# Patient Record
Sex: MALE | Race: WHITE | NOT HISPANIC OR LATINO | URBAN - METROPOLITAN AREA
[De-identification: names, ages, dates, MRNs, and addresses within clinical notes are randomized per-mention and may not be internally consistent; named-entity substitution may affect disease eponyms.]

---

## 2017-05-29 ENCOUNTER — INPATIENT (INPATIENT)
Facility: HOSPITAL | Age: 82
LOS: 0 days | Discharge: HOME | End: 2017-05-30
Attending: HOSPITALIST

## 2017-05-29 ENCOUNTER — EMERGENCY (EMERGENCY)
Facility: HOSPITAL | Age: 82
LOS: 0 days | Discharge: HOME | End: 2017-05-29

## 2017-05-29 DIAGNOSIS — Z86.73 PERSONAL HISTORY OF TRANSIENT ISCHEMIC ATTACK (TIA), AND CEREBRAL INFARCTION WITHOUT RESIDUAL DEFICITS: ICD-10-CM

## 2017-05-29 DIAGNOSIS — I25.10 ATHEROSCLEROTIC HEART DISEASE OF NATIVE CORONARY ARTERY WITHOUT ANGINA PECTORIS: ICD-10-CM

## 2017-05-29 DIAGNOSIS — Z95.1 PRESENCE OF AORTOCORONARY BYPASS GRAFT: ICD-10-CM

## 2017-05-29 DIAGNOSIS — I10 ESSENTIAL (PRIMARY) HYPERTENSION: ICD-10-CM

## 2017-05-29 DIAGNOSIS — R07.89 OTHER CHEST PAIN: ICD-10-CM

## 2017-05-29 DIAGNOSIS — F17.210 NICOTINE DEPENDENCE, CIGARETTES, UNCOMPLICATED: ICD-10-CM

## 2017-05-29 DIAGNOSIS — M06.9 RHEUMATOID ARTHRITIS, UNSPECIFIED: ICD-10-CM

## 2017-05-29 DIAGNOSIS — I48.91 UNSPECIFIED ATRIAL FIBRILLATION: ICD-10-CM

## 2017-05-29 DIAGNOSIS — Z95.5 PRESENCE OF CORONARY ANGIOPLASTY IMPLANT AND GRAFT: ICD-10-CM

## 2017-05-29 DIAGNOSIS — M10.9 GOUT, UNSPECIFIED: ICD-10-CM

## 2017-05-29 DIAGNOSIS — R07.9 CHEST PAIN, UNSPECIFIED: ICD-10-CM

## 2017-05-29 DIAGNOSIS — I48.2 CHRONIC ATRIAL FIBRILLATION: ICD-10-CM

## 2017-05-29 DIAGNOSIS — E78.5 HYPERLIPIDEMIA, UNSPECIFIED: ICD-10-CM

## 2019-05-30 ENCOUNTER — EMERGENCY (EMERGENCY)
Facility: HOSPITAL | Age: 84
LOS: 0 days | Discharge: HOME | End: 2019-05-31
Attending: EMERGENCY MEDICINE | Admitting: EMERGENCY MEDICINE
Payer: MEDICARE

## 2019-05-30 VITALS
DIASTOLIC BLOOD PRESSURE: 76 MMHG | RESPIRATION RATE: 18 BRPM | TEMPERATURE: 96 F | SYSTOLIC BLOOD PRESSURE: 165 MMHG | WEIGHT: 199.96 LBS | HEIGHT: 70 IN | HEART RATE: 56 BPM | OXYGEN SATURATION: 96 %

## 2019-05-30 DIAGNOSIS — Z79.01 LONG TERM (CURRENT) USE OF ANTICOAGULANTS: ICD-10-CM

## 2019-05-30 DIAGNOSIS — I10 ESSENTIAL (PRIMARY) HYPERTENSION: ICD-10-CM

## 2019-05-30 DIAGNOSIS — I25.10 ATHEROSCLEROTIC HEART DISEASE OF NATIVE CORONARY ARTERY WITHOUT ANGINA PECTORIS: ICD-10-CM

## 2019-05-30 DIAGNOSIS — R05 COUGH: ICD-10-CM

## 2019-05-30 DIAGNOSIS — E78.5 HYPERLIPIDEMIA, UNSPECIFIED: ICD-10-CM

## 2019-05-30 DIAGNOSIS — I48.91 UNSPECIFIED ATRIAL FIBRILLATION: ICD-10-CM

## 2019-05-30 DIAGNOSIS — Z95.1 PRESENCE OF AORTOCORONARY BYPASS GRAFT: ICD-10-CM

## 2019-05-30 DIAGNOSIS — M10.9 GOUT, UNSPECIFIED: ICD-10-CM

## 2019-05-30 DIAGNOSIS — Z87.891 PERSONAL HISTORY OF NICOTINE DEPENDENCE: ICD-10-CM

## 2019-05-30 DIAGNOSIS — R07.9 CHEST PAIN, UNSPECIFIED: ICD-10-CM

## 2019-05-30 LAB
ALBUMIN SERPL ELPH-MCNC: 4.4 G/DL — SIGNIFICANT CHANGE UP (ref 3.5–5.2)
ALP SERPL-CCNC: 84 U/L — SIGNIFICANT CHANGE UP (ref 30–115)
ALT FLD-CCNC: 26 U/L — SIGNIFICANT CHANGE UP (ref 0–41)
ANION GAP SERPL CALC-SCNC: 14 MMOL/L — SIGNIFICANT CHANGE UP (ref 7–14)
AST SERPL-CCNC: 31 U/L — SIGNIFICANT CHANGE UP (ref 0–41)
BILIRUB SERPL-MCNC: 0.4 MG/DL — SIGNIFICANT CHANGE UP (ref 0.2–1.2)
BUN SERPL-MCNC: 20 MG/DL — SIGNIFICANT CHANGE UP (ref 10–20)
CALCIUM SERPL-MCNC: 9.2 MG/DL — SIGNIFICANT CHANGE UP (ref 8.5–10.1)
CHLORIDE SERPL-SCNC: 99 MMOL/L — SIGNIFICANT CHANGE UP (ref 98–110)
CO2 SERPL-SCNC: 24 MMOL/L — SIGNIFICANT CHANGE UP (ref 17–32)
CREAT SERPL-MCNC: 1.3 MG/DL — SIGNIFICANT CHANGE UP (ref 0.7–1.5)
GLUCOSE SERPL-MCNC: 100 MG/DL — HIGH (ref 70–99)
HCT VFR BLD CALC: 45.9 % — SIGNIFICANT CHANGE UP (ref 42–52)
HGB BLD-MCNC: 15.5 G/DL — SIGNIFICANT CHANGE UP (ref 14–18)
INR BLD: 1.96 RATIO — HIGH (ref 0.65–1.3)
MCHC RBC-ENTMCNC: 31.8 PG — HIGH (ref 27–31)
MCHC RBC-ENTMCNC: 33.8 G/DL — SIGNIFICANT CHANGE UP (ref 32–37)
MCV RBC AUTO: 94.1 FL — HIGH (ref 80–94)
NRBC # BLD: 0 /100 WBCS — SIGNIFICANT CHANGE UP (ref 0–0)
NT-PROBNP SERPL-SCNC: 846 PG/ML — HIGH (ref 0–300)
PLATELET # BLD AUTO: 187 K/UL — SIGNIFICANT CHANGE UP (ref 130–400)
POTASSIUM SERPL-MCNC: 4.6 MMOL/L — SIGNIFICANT CHANGE UP (ref 3.5–5)
POTASSIUM SERPL-SCNC: 4.6 MMOL/L — SIGNIFICANT CHANGE UP (ref 3.5–5)
PROT SERPL-MCNC: 6.5 G/DL — SIGNIFICANT CHANGE UP (ref 6–8)
PROTHROM AB SERPL-ACNC: 22.4 SEC — HIGH (ref 9.95–12.87)
RBC # BLD: 4.88 M/UL — SIGNIFICANT CHANGE UP (ref 4.7–6.1)
RBC # FLD: 13.1 % — SIGNIFICANT CHANGE UP (ref 11.5–14.5)
SODIUM SERPL-SCNC: 137 MMOL/L — SIGNIFICANT CHANGE UP (ref 135–146)
TROPONIN T SERPL-MCNC: <0.01 NG/ML — SIGNIFICANT CHANGE UP
WBC # BLD: 6.74 K/UL — SIGNIFICANT CHANGE UP (ref 4.8–10.8)
WBC # FLD AUTO: 6.74 K/UL — SIGNIFICANT CHANGE UP (ref 4.8–10.8)

## 2019-05-30 PROCEDURE — 93010 ELECTROCARDIOGRAM REPORT: CPT

## 2019-05-30 PROCEDURE — 99285 EMERGENCY DEPT VISIT HI MDM: CPT | Mod: 25

## 2019-05-30 NOTE — ED ADULT NURSE NOTE - OBJECTIVE STATEMENT
pt c/o cough x 1 week, and chest pain. Denies SOB. Pt is on Augmentin and Mucinex for cough prescribed by his PMD.

## 2019-05-30 NOTE — ED PROVIDER NOTE - ATTENDING CONTRIBUTION TO CARE
83yM p/w cough x 1week, now worsening despite amoxicillin he has been taking.  Pt denies fever.  Reports cough is now more productive (though he is now on mucinex as well) and now having pleuritic chest pain.    VS notable for HR 56 /76  CONSTITUTIONAL: well developed; well nourished; well appearing in no acute distress  HEAD: normocephalic; atraumatic  EYES: no conjunctival injection, no scleral icterus  ENT: no nasal discharge; airway clear.  NECK: supple; non tender. + full passive ROM in all directions  CARD: irregularly irregular, bradycardic  RESP: no wheezes, rales or rhonchi. b/l diminished lung sounds without significant accessory muscle use  ABD: soft; non-distended; non-tender. No rebound, no guarding, no pulsatile abdominal mass  EXT: moving all extremities spontaneously, normal ROM. No clubbing, cyanosis or edema  SKIN: warm and dry, no lesions noted  NEURO: alert, oriented, CN II-XII grossly intact, motor and sensory grossly intact, speech nonslurred, no focal deficits. GCS 15  PSYCH: calm, cooperative, appropriate, good eye contact, logical thought process, no apparent danger to self or others    EKG  CXR  labs  reassess

## 2019-05-30 NOTE — ED PROVIDER NOTE - CARE PLAN
Principal Discharge DX:	Chest pain  Secondary Diagnosis:	Afib Principal Discharge DX:	Chest pain  Secondary Diagnosis:	Afib  Secondary Diagnosis:	Cough

## 2019-05-30 NOTE — ED PROVIDER NOTE - CLINICAL SUMMARY MEDICAL DECISION MAKING FREE TEXT BOX
83yM afib p/w cough x 1wk, now w/ pleuritic CP.  No fevers. Pt self-medicating w/ amoxicillin and mucinex w/o improvement.  Pt well appearing, breathing comfortably on RA.  Labs reassuring. EKG w/o ischemia. CXR w/o focal consolidation.  Will place pt in obs for serial trop, tele monitoring and further testing.

## 2019-05-30 NOTE — ED PROVIDER NOTE - OBJECTIVE STATEMENT
83 yold male to ED Pmhx Cad with Cabg x4, Htn, Afib, Cva with right hemiparesis(almost resolved), HLD, Gout c/o URI with runny nose, sore throat, cough x 1 week - pt seen by pmd DR. Fraire and started on Amox, mucinex; today pt developed chest pain sharp pleuritic/tussive and worse with movement; pt denies n/v/fever, chills, sob, diaphoreisis; last cardiac workup ~ 1 yr ago;

## 2019-05-30 NOTE — ED PROVIDER NOTE - PHYSICAL EXAMINATION
Constitutional: Well developed, well nourished. NAD  Head: Normocephalic, atraumatic.  Eyes: PERRL, EOMI.  ENT: No nasal discharge. Mucous membranes dry.  Neck: Supple. Painless ROM.  Cardiovascular: Regular rate and rhythm. No murmurs, rubs, or gallops.  Pulmonary: Lungs clear to auscultation bilaterally.   Abdominal: Soft. Nondistended. No rebound, guarding, rigidity.  Extremities. Pelvis stable. No lower extremity edema, symmetric calves.  Skin: No rashes, cyanosis.  Neuro: AAOx3. No focal neurological deficits.  Psych: Normal mood. Normal affect.

## 2019-05-31 VITALS
SYSTOLIC BLOOD PRESSURE: 145 MMHG | RESPIRATION RATE: 18 BRPM | OXYGEN SATURATION: 99 % | HEART RATE: 58 BPM | DIASTOLIC BLOOD PRESSURE: 77 MMHG | TEMPERATURE: 97 F

## 2019-05-31 LAB — TROPONIN T SERPL-MCNC: <0.01 NG/ML — SIGNIFICANT CHANGE UP

## 2019-05-31 PROCEDURE — 71046 X-RAY EXAM CHEST 2 VIEWS: CPT | Mod: 26

## 2019-05-31 PROCEDURE — 99234 HOSP IP/OBS SM DT SF/LOW 45: CPT

## 2019-05-31 PROCEDURE — 93010 ELECTROCARDIOGRAM REPORT: CPT

## 2019-05-31 RX ORDER — ALLOPURINOL 300 MG
100 TABLET ORAL
Refills: 0 | Status: DISCONTINUED | OUTPATIENT
Start: 2019-05-31 | End: 2019-05-31

## 2019-05-31 RX ORDER — AMLODIPINE BESYLATE 2.5 MG/1
5 TABLET ORAL DAILY
Refills: 0 | Status: DISCONTINUED | OUTPATIENT
Start: 2019-05-31 | End: 2019-05-31

## 2019-05-31 RX ORDER — AZITHROMYCIN 500 MG/1
1 TABLET, FILM COATED ORAL
Qty: 5 | Refills: 0
Start: 2019-05-31 | End: 2019-06-04

## 2019-05-31 RX ORDER — LISINOPRIL 2.5 MG/1
40 TABLET ORAL DAILY
Refills: 0 | Status: DISCONTINUED | OUTPATIENT
Start: 2019-05-31 | End: 2019-05-31

## 2019-05-31 RX ADMIN — AMLODIPINE BESYLATE 5 MILLIGRAM(S): 2.5 TABLET ORAL at 06:12

## 2019-05-31 RX ADMIN — Medication 100 MILLIGRAM(S): at 06:12

## 2019-05-31 RX ADMIN — LISINOPRIL 40 MILLIGRAM(S): 2.5 TABLET ORAL at 06:13

## 2019-05-31 NOTE — ED ADULT NURSE REASSESSMENT NOTE - NS ED NURSE REASSESS COMMENT FT1
pt aox4 denies chest pain / discomfort. rpt troponin sent.  denies dizziness, headache, sob. respirations even / unlabored. vss will remains on cardiac monitor. will continue to monitor / assess.

## 2019-05-31 NOTE — ED ADULT NURSE REASSESSMENT NOTE - NS ED NURSE REASSESS COMMENT FT1
pt aox4 denies chest pain / discomfort. rpt troponin sent.  denies dizziness, headache, sob. respirations even / unlabored. vss will remains on cardiac monitor.  PA Rashawn aware pt bradycardic at baseline.  continue to monitor / assess

## 2019-05-31 NOTE — ED ADULT NURSE REASSESSMENT NOTE - NS ED NURSE REASSESS COMMENT FT1
pt A&X4. VSS. pt on cardiac monitor.  pt explained plan of care. will continue to monitor and continue with plan of care.

## 2019-05-31 NOTE — ED CDU PROVIDER INITIAL DAY NOTE - OBJECTIVE STATEMENT
84 yo M with PMHx of HLD, HLD, aFib on Xarelto. CVA with resolving right hemiparesis, CAD s/p CABG x 4, and gout presents to the ED c/o cough, sore throat and runny nose x 1 week with development of sharp diffuse chest pain today. Pain is worse with movement and coughing. Pt has been taking amoxicillin and Mucinex without improvement in symptoms. Pt denies other complaints. Pt denies fever, chills, nausea, vomiting, abdominal pain, diarrhea, headache, dizziness, weakness, SOB, back pain, LOC, trauma, urinary symptoms, calf pain/swelling, recent travel, recent surgery.

## 2019-05-31 NOTE — ED CDU PROVIDER DISPOSITION NOTE - CLINICAL COURSE
Patient had no events on telemetry.  2 sets of negative troponins and non ischemic EKG.  Pain occurs exclusively with cough.  D.C home.  Supportive care.  Continue all meds as prescribed.  Rx Azithromycin given persistence of symptoms and comorbidities.

## 2019-05-31 NOTE — ED CDU PROVIDER DISPOSITION NOTE - CARE PROVIDER_API CALL
Joby Camilo (MD)  Cardiovascular Disease; Internal Medicine; Interventional Cardiology  [address] [place] [address]  Phone: [phone]  Fax: [phone]  Follow Up Time:

## 2019-05-31 NOTE — ED CDU PROVIDER INITIAL DAY NOTE - PHYSICAL EXAMINATION
VITAL SIGNS: I have reviewed nursing notes and confirm.  CONSTITUTIONAL: Well-developed; well-nourished; in no acute distress.  SKIN: Skin exam is warm and dry, no acute rash.  HEAD: Normocephalic; atraumatic.  EYES: Conjunctiva and sclera clear.  ENT: No nasal discharge; airway clear.   NECK: Supple; non tender.  CARD: S1, S2 normal; no murmurs, gallops, or rubs. Regular rate and rhythm.  RESP: No wheezes, rales or rhonchi. Speaking in full sentences.   ABD: Normal bowel sounds; soft; non-distended; non-tender; No rebound or guarding. No CVA tenderness.  EXT: Normal ROM. No clubbing, cyanosis or edema.  NEURO: Alert, oriented. Grossly unremarkable. No focal deficits.

## 2019-05-31 NOTE — ED CDU PROVIDER INITIAL DAY NOTE - NS ED ROS FT
Review of Systems  Constitutional:  No fever, chills.  Eyes:  No visual changes, eye pain, or discharge.  ENMT:  No hearing changes, pain, or discharge. No nasal congestion, discharge, or bleeding. No swelling, or difficulty swallowing. (+) rhinorrhea, sore throat  Cardiac:  No palpitations, syncope, or edema. (+) chest pain  Respiratory:  No dyspnea. No hemoptysis. (+) cough  GI:  No nausea, vomiting, diarrhea, or abdominal pain.   :  No dysuria, hematuria, frequency, or burning.   Skin:  No skin rash, pruritis, jaundice, or lesions.  Neuro:  No headache, dizziness, loss of sensation, or focal weakness.  No change in mental status.

## 2019-12-02 PROBLEM — Z00.00 ENCOUNTER FOR PREVENTIVE HEALTH EXAMINATION: Status: ACTIVE | Noted: 2019-12-02

## 2021-04-25 ENCOUNTER — EMERGENCY (EMERGENCY)
Facility: HOSPITAL | Age: 86
LOS: 0 days | Discharge: HOME | End: 2021-04-26
Attending: STUDENT IN AN ORGANIZED HEALTH CARE EDUCATION/TRAINING PROGRAM | Admitting: STUDENT IN AN ORGANIZED HEALTH CARE EDUCATION/TRAINING PROGRAM
Payer: MEDICARE

## 2021-04-25 VITALS
DIASTOLIC BLOOD PRESSURE: 60 MMHG | OXYGEN SATURATION: 98 % | HEART RATE: 68 BPM | HEIGHT: 70 IN | WEIGHT: 190.04 LBS | SYSTOLIC BLOOD PRESSURE: 134 MMHG | RESPIRATION RATE: 18 BRPM | TEMPERATURE: 97 F

## 2021-04-25 DIAGNOSIS — Z86.73 PERSONAL HISTORY OF TRANSIENT ISCHEMIC ATTACK (TIA), AND CEREBRAL INFARCTION WITHOUT RESIDUAL DEFICITS: ICD-10-CM

## 2021-04-25 DIAGNOSIS — Z95.1 PRESENCE OF AORTOCORONARY BYPASS GRAFT: ICD-10-CM

## 2021-04-25 DIAGNOSIS — Z95.0 PRESENCE OF CARDIAC PACEMAKER: ICD-10-CM

## 2021-04-25 DIAGNOSIS — I10 ESSENTIAL (PRIMARY) HYPERTENSION: ICD-10-CM

## 2021-04-25 DIAGNOSIS — M10.9 GOUT, UNSPECIFIED: ICD-10-CM

## 2021-04-25 DIAGNOSIS — I25.10 ATHEROSCLEROTIC HEART DISEASE OF NATIVE CORONARY ARTERY WITHOUT ANGINA PECTORIS: ICD-10-CM

## 2021-04-25 DIAGNOSIS — R07.89 OTHER CHEST PAIN: ICD-10-CM

## 2021-04-25 DIAGNOSIS — E78.5 HYPERLIPIDEMIA, UNSPECIFIED: ICD-10-CM

## 2021-04-25 DIAGNOSIS — Z87.891 PERSONAL HISTORY OF NICOTINE DEPENDENCE: ICD-10-CM

## 2021-04-25 DIAGNOSIS — Z79.82 LONG TERM (CURRENT) USE OF ASPIRIN: ICD-10-CM

## 2021-04-25 DIAGNOSIS — I48.91 UNSPECIFIED ATRIAL FIBRILLATION: ICD-10-CM

## 2021-04-25 DIAGNOSIS — Z79.01 LONG TERM (CURRENT) USE OF ANTICOAGULANTS: ICD-10-CM

## 2021-04-25 PROBLEM — I63.9 CEREBRAL INFARCTION, UNSPECIFIED: Chronic | Status: ACTIVE | Noted: 2019-05-30

## 2021-04-25 LAB
ALBUMIN SERPL ELPH-MCNC: 4.4 G/DL — SIGNIFICANT CHANGE UP (ref 3.5–5.2)
ALP SERPL-CCNC: 110 U/L — SIGNIFICANT CHANGE UP (ref 30–115)
ALT FLD-CCNC: 28 U/L — SIGNIFICANT CHANGE UP (ref 0–41)
ANION GAP SERPL CALC-SCNC: 13 MMOL/L — SIGNIFICANT CHANGE UP (ref 7–14)
ANION GAP SERPL CALC-SCNC: 13 MMOL/L — SIGNIFICANT CHANGE UP (ref 7–14)
AST SERPL-CCNC: 36 U/L — SIGNIFICANT CHANGE UP (ref 0–41)
BASOPHILS # BLD AUTO: 0.06 K/UL — SIGNIFICANT CHANGE UP (ref 0–0.2)
BASOPHILS NFR BLD AUTO: 1.1 % — HIGH (ref 0–1)
BILIRUB SERPL-MCNC: 0.5 MG/DL — SIGNIFICANT CHANGE UP (ref 0.2–1.2)
BUN SERPL-MCNC: 20 MG/DL — SIGNIFICANT CHANGE UP (ref 10–20)
BUN SERPL-MCNC: 21 MG/DL — HIGH (ref 10–20)
CALCIUM SERPL-MCNC: 8.7 MG/DL — SIGNIFICANT CHANGE UP (ref 8.5–10.1)
CALCIUM SERPL-MCNC: 9.3 MG/DL — SIGNIFICANT CHANGE UP (ref 8.5–10.1)
CHLORIDE SERPL-SCNC: 102 MMOL/L — SIGNIFICANT CHANGE UP (ref 98–110)
CHLORIDE SERPL-SCNC: 104 MMOL/L — SIGNIFICANT CHANGE UP (ref 98–110)
CO2 SERPL-SCNC: 22 MMOL/L — SIGNIFICANT CHANGE UP (ref 17–32)
CO2 SERPL-SCNC: 22 MMOL/L — SIGNIFICANT CHANGE UP (ref 17–32)
CREAT SERPL-MCNC: 1.3 MG/DL — SIGNIFICANT CHANGE UP (ref 0.7–1.5)
CREAT SERPL-MCNC: 1.4 MG/DL — SIGNIFICANT CHANGE UP (ref 0.7–1.5)
EOSINOPHIL # BLD AUTO: 0.03 K/UL — SIGNIFICANT CHANGE UP (ref 0–0.7)
EOSINOPHIL NFR BLD AUTO: 0.6 % — SIGNIFICANT CHANGE UP (ref 0–8)
GLUCOSE SERPL-MCNC: 180 MG/DL — HIGH (ref 70–99)
GLUCOSE SERPL-MCNC: 84 MG/DL — SIGNIFICANT CHANGE UP (ref 70–99)
HCT VFR BLD CALC: 45.4 % — SIGNIFICANT CHANGE UP (ref 42–52)
HGB BLD-MCNC: 14.9 G/DL — SIGNIFICANT CHANGE UP (ref 14–18)
IMM GRANULOCYTES NFR BLD AUTO: 0.2 % — SIGNIFICANT CHANGE UP (ref 0.1–0.3)
LIDOCAIN IGE QN: 36 U/L — SIGNIFICANT CHANGE UP (ref 7–60)
LYMPHOCYTES # BLD AUTO: 1.07 K/UL — LOW (ref 1.2–3.4)
LYMPHOCYTES # BLD AUTO: 19.7 % — LOW (ref 20.5–51.1)
MCHC RBC-ENTMCNC: 31.1 PG — HIGH (ref 27–31)
MCHC RBC-ENTMCNC: 32.8 G/DL — SIGNIFICANT CHANGE UP (ref 32–37)
MCV RBC AUTO: 94.8 FL — HIGH (ref 80–94)
MONOCYTES # BLD AUTO: 0.48 K/UL — SIGNIFICANT CHANGE UP (ref 0.1–0.6)
MONOCYTES NFR BLD AUTO: 8.8 % — SIGNIFICANT CHANGE UP (ref 1.7–9.3)
NEUTROPHILS # BLD AUTO: 3.79 K/UL — SIGNIFICANT CHANGE UP (ref 1.4–6.5)
NEUTROPHILS NFR BLD AUTO: 69.6 % — SIGNIFICANT CHANGE UP (ref 42.2–75.2)
NRBC # BLD: 0 /100 WBCS — SIGNIFICANT CHANGE UP (ref 0–0)
PLATELET # BLD AUTO: 174 K/UL — SIGNIFICANT CHANGE UP (ref 130–400)
POTASSIUM SERPL-MCNC: 4.3 MMOL/L — SIGNIFICANT CHANGE UP (ref 3.5–5)
POTASSIUM SERPL-MCNC: 5.1 MMOL/L — HIGH (ref 3.5–5)
POTASSIUM SERPL-SCNC: 4.3 MMOL/L — SIGNIFICANT CHANGE UP (ref 3.5–5)
POTASSIUM SERPL-SCNC: 5.1 MMOL/L — HIGH (ref 3.5–5)
PROT SERPL-MCNC: 6.5 G/DL — SIGNIFICANT CHANGE UP (ref 6–8)
RAPID RVP RESULT: SIGNIFICANT CHANGE UP
RBC # BLD: 4.79 M/UL — SIGNIFICANT CHANGE UP (ref 4.7–6.1)
RBC # FLD: 13.9 % — SIGNIFICANT CHANGE UP (ref 11.5–14.5)
SARS-COV-2 RNA SPEC QL NAA+PROBE: SIGNIFICANT CHANGE UP
SODIUM SERPL-SCNC: 137 MMOL/L — SIGNIFICANT CHANGE UP (ref 135–146)
SODIUM SERPL-SCNC: 139 MMOL/L — SIGNIFICANT CHANGE UP (ref 135–146)
TROPONIN T SERPL-MCNC: <0.01 NG/ML — SIGNIFICANT CHANGE UP
TROPONIN T SERPL-MCNC: <0.01 NG/ML — SIGNIFICANT CHANGE UP
WBC # BLD: 5.44 K/UL — SIGNIFICANT CHANGE UP (ref 4.8–10.8)
WBC # FLD AUTO: 5.44 K/UL — SIGNIFICANT CHANGE UP (ref 4.8–10.8)

## 2021-04-25 PROCEDURE — 71046 X-RAY EXAM CHEST 2 VIEWS: CPT | Mod: 26

## 2021-04-25 PROCEDURE — 93010 ELECTROCARDIOGRAM REPORT: CPT

## 2021-04-25 PROCEDURE — 99218: CPT | Mod: CS

## 2021-04-25 PROCEDURE — 93010 ELECTROCARDIOGRAM REPORT: CPT | Mod: 77

## 2021-04-25 RX ORDER — RIVAROXABAN 15 MG-20MG
20 KIT ORAL ONCE
Refills: 0 | Status: COMPLETED | OUTPATIENT
Start: 2021-04-25 | End: 2021-04-25

## 2021-04-25 RX ORDER — AMIODARONE HYDROCHLORIDE 400 MG/1
100 TABLET ORAL
Refills: 0 | Status: DISCONTINUED | OUTPATIENT
Start: 2021-04-25 | End: 2021-04-26

## 2021-04-25 RX ORDER — LISINOPRIL 2.5 MG/1
40 TABLET ORAL DAILY
Refills: 0 | Status: DISCONTINUED | OUTPATIENT
Start: 2021-04-25 | End: 2021-04-26

## 2021-04-25 RX ORDER — ACETAMINOPHEN 500 MG
650 TABLET ORAL ONCE
Refills: 0 | Status: COMPLETED | OUTPATIENT
Start: 2021-04-25 | End: 2021-04-25

## 2021-04-25 RX ORDER — AMLODIPINE BESYLATE 2.5 MG/1
5 TABLET ORAL DAILY
Refills: 0 | Status: DISCONTINUED | OUTPATIENT
Start: 2021-04-25 | End: 2021-04-26

## 2021-04-25 RX ORDER — ASPIRIN/CALCIUM CARB/MAGNESIUM 324 MG
81 TABLET ORAL DAILY
Refills: 0 | Status: DISCONTINUED | OUTPATIENT
Start: 2021-04-25 | End: 2021-04-26

## 2021-04-25 RX ORDER — HYDROXYCHLOROQUINE SULFATE 200 MG
200 TABLET ORAL DAILY
Refills: 0 | Status: DISCONTINUED | OUTPATIENT
Start: 2021-04-25 | End: 2021-04-26

## 2021-04-25 RX ADMIN — RIVAROXABAN 20 MILLIGRAM(S): KIT at 18:02

## 2021-04-25 RX ADMIN — Medication 200 MILLIGRAM(S): at 18:02

## 2021-04-25 RX ADMIN — Medication 650 MILLIGRAM(S): at 14:44

## 2021-04-25 RX ADMIN — AMIODARONE HYDROCHLORIDE 100 MILLIGRAM(S): 400 TABLET ORAL at 18:02

## 2021-04-25 NOTE — ED ADULT TRIAGE NOTE - CHIEF COMPLAINT QUOTE
Pt c/o CP located on the Right side radiating to the R side of his neck. Adalberto SOB/n/v/dizziness.

## 2021-04-25 NOTE — ED PROVIDER NOTE - ATTENDING CONTRIBUTION TO CARE
85 year old male, pmhx as documented presenting with chest pain x 1 day described as tight, substernal, non-radiating, no palliative or provocative factors, mild severity. Otherwise denies fevers, cough, dyspnea, palpitations, N/V/D, abdominal pain, blood in stool, urinary symptoms or leg swelling.    Vital Signs: I have reviewed the initial vital signs.  Constitutional: NAD, well-nourished, appears stated age, no acute distress.  HEENT: Airway patent, moist MM, no erythema/swelling/deformity of oral structures. EOMI, PERRLA.  CV: regular rate, regular rhythm, well-perfused extremities, 2+ b/l DP and radial pulses equal.  Lungs: BCTA, no increased WOB.  ABD: NTND, no guarding or rebound, no pulsatile mass, no hernias.   MSK: Neck supple, nontender, nl ROM, no stepoff. Chest nontender. Back nontender in TLS spine or to b/l bony structures or flanks. Ext nontender, nl rom, no deformity.   INTEG: Skin warm, dry, no rash.  NEURO: A&Ox3, normal strength, nl sensation throughout, normal speech.   PSYCH: Calm, cooperative, normal affect and interaction.    Will obtain EKG, CXR, labs, re-eval.

## 2021-04-25 NOTE — ED CDU PROVIDER INITIAL DAY NOTE - OBJECTIVE STATEMENT
84 y/o M, PMHx CAD - s/p CABG, A. Fib (on Xarelto), s/p pacemaker placement (approx. one month ago), HTN. HLD, CVA & Gout, presents to the ED with complaints of chest pain x one day. Patient states that he awoke feeling well however several hours later developed right sided chest discomfort; denies accompanying dyspnea, back pain, abdominal pain, fever, chills, skin color changes/rash and lower extremity swelling. He takes ASA daily as well. He states that he is s/p cardioversion by Dr. Santiago ~ one week ago. 86 y/o M, PMHx CAD - s/p CABG, A. Fib (on Xarelto), s/p pacemaker placement (approx. one month ago), HTN. HLD, CVA & Gout, presents to the ED with complaints of chest pain x one day. Patient states that he awoke feeling well however several hours later developed right sided chest discomfort; denies accompanying dyspnea, back pain, abdominal pain, fever, chills, skin color changes/rash and lower extremity swelling. He takes ASA daily as well.

## 2021-04-25 NOTE — ED PROVIDER NOTE - CLINICAL SUMMARY MEDICAL DECISION MAKING FREE TEXT BOX
Patient presented with chest pain x 1 day. Otherwise afebrile, Hd stable, well appearing. EKG obtained and non-ischemic without evidence of STEMI. Obtained labs which were grossly unremarkable including no significant leukocytosis, anemia, signs of dehydration/CHRISTOPHER, transaminitis or significant electrolyte abnormalities. Trop negative. Chest xray negative for pneumothorax, pneumonia, widened mediastinum, evidence of rib fractures, enlarged cardiac silhouette or any other emergent pathologies. Patient remained without recurrence of chest pain or abnormalities during monitoring in ED. However, given medical hx, patient relatively high risk and will require further work up. Will place in obs for further ACS work up. Patient agreeable with plan.

## 2021-04-25 NOTE — ED PROVIDER NOTE - OBJECTIVE STATEMENT
84 y/o M, PMHx CAD - s/p CABG, A. Fib (on Xarelto), s/p pacemaker placement (approx. one month ago), HTN. HLD, CVA & Gout, presents to the ED with complaints of chest pain x one day. Patient states that he awoke feeling well however several hours later developed right sided chest discomfort; denies accompanying dyspnea, back pain, abdominal pain, fever, chills, skin color changes/rash and lower extremity swelling. He takes ASA daily as well. He states that he is s/p cardioversion by Dr. Santiago ~ one week ago.

## 2021-04-25 NOTE — ED CDU PROVIDER INITIAL DAY NOTE - ATTENDING CONTRIBUTION TO CARE
Hx of CAGB 6 years ago, Afib, s\p electrical cardioversion done 3 days ago at [facility] for afib, pt required sedation. This am noted severe pain to the right chest. NO associated shortness of breath, nausea, vomiting or diaphoresis. NO leg swelling or tenderness. On exam S1S2 rrr, lungs clears, no rash to the chest, + tenderness to the right pectoralis muscle. No costochondral tenderness.

## 2021-04-25 NOTE — ED CDU PROVIDER INITIAL DAY NOTE - MEDICAL DECISION MAKING DETAILS
Pt presents with pain to the right pectoralis muscle s\p cardioversion three days ago. Reproducible tenderness. Placed into observation for ACS evaluation. Stress in the am. Pt sees Dr. Sequeira of cardiology.

## 2021-04-26 VITALS
DIASTOLIC BLOOD PRESSURE: 74 MMHG | RESPIRATION RATE: 16 BRPM | HEART RATE: 63 BPM | SYSTOLIC BLOOD PRESSURE: 134 MMHG | TEMPERATURE: 97 F | OXYGEN SATURATION: 99 %

## 2021-04-26 PROCEDURE — 99217: CPT | Mod: CS

## 2021-04-26 PROCEDURE — 93016 CV STRESS TEST SUPVJ ONLY: CPT

## 2021-04-26 PROCEDURE — G1004: CPT

## 2021-04-26 PROCEDURE — 93018 CV STRESS TEST I&R ONLY: CPT

## 2021-04-26 PROCEDURE — 78452 HT MUSCLE IMAGE SPECT MULT: CPT | Mod: 26,ME

## 2021-04-26 RX ORDER — ADENOSINE 3 MG/ML
60 INJECTION INTRAVENOUS ONCE
Refills: 0 | Status: COMPLETED | OUTPATIENT
Start: 2021-04-26 | End: 2021-04-26

## 2021-04-26 RX ADMIN — AMIODARONE HYDROCHLORIDE 100 MILLIGRAM(S): 400 TABLET ORAL at 05:54

## 2021-04-26 RX ADMIN — LISINOPRIL 40 MILLIGRAM(S): 2.5 TABLET ORAL at 05:54

## 2021-04-26 RX ADMIN — AMLODIPINE BESYLATE 5 MILLIGRAM(S): 2.5 TABLET ORAL at 05:54

## 2021-04-26 RX ADMIN — ADENOSINE 600 MILLIGRAM(S): 3 INJECTION INTRAVENOUS at 11:57

## 2021-04-26 NOTE — ED CDU PROVIDER DISPOSITION NOTE - CLINICAL COURSE
pt well appearing in ED. obs w/u negative including serial trops, ekgs, NM stress test. pt comfortable w/ dc and close f/u for atypical cp

## 2021-04-26 NOTE — ED ADULT NURSE REASSESSMENT NOTE - NS ED NURSE REASSESS COMMENT FT1
Pt reassessed, A/Ox3, VSS, pt denies chest pain at this time, no signs of acute distress noted, pending nuclear stress test, cardiac monitoring in place, comfort and safety maintained, will continue to assess.

## 2021-04-26 NOTE — ED CDU PROVIDER DISPOSITION NOTE - PROVIDER TOKENS
PROVIDER:[TOKEN:[[id number]],FOLLOWUP:[4-6 Days]],PROVIDER:[TOKEN:[[id number]],FOLLOWUP:[4-6 Days]]

## 2021-04-26 NOTE — ED CDU PROVIDER DISPOSITION NOTE - PATIENT PORTAL LINK FT
You can access the FollowMyHealth Patient Portal offered by [facility] by registering at the following website: [email or web address]. By joining [employer]’s FollowMyHealth portal, you will also be able to view your health information using other applications (apps) compatible with our system.

## 2021-04-26 NOTE — ED CDU PROVIDER DISPOSITION NOTE - CARE PROVIDERS DIRECT ADDRESSES
[email or web address]

## 2021-04-26 NOTE — ED CDU PROVIDER SUBSEQUENT DAY NOTE - PROGRESS NOTE DETAILS
Pt resting comfortably, denies any complaints. Will continue to monitor. Pt seen at bedside, NAD. Arrived overnight, received from JERONIMO Morocho. Pt presenting for right sided chest pain. Cardiac enzymes negative x 2. Covid negative. Cardiologist- Dr Ronquillo. Pt scheduled for Pharm Nuclear stress test today.

## 2021-04-26 NOTE — ED CDU PROVIDER DISPOSITION NOTE - NSFOLLOWUPINSTRUCTIONS_ED_ALL_ED_FT
Follow up with your PMD in 1 week  Follow up with your Cardiologist - Dr Ronquillo in 1 week  Continue all your home medications  Return to the ED if your symptoms worsen/return

## 2021-04-26 NOTE — ED CDU PROVIDER SUBSEQUENT DAY NOTE - MEDICAL DECISION MAKING DETAILS
overnight, pt feeling well. no complaints. no cp except w/ direct palpation of R chest. labs negative. pending nm stress

## 2021-04-26 NOTE — ED CDU PROVIDER SUBSEQUENT DAY NOTE - NS ED MD PROGRESS NOTE PROVIDER NAME FT
JERONIMO Morocho - now extubated was on bipap at night  - Patient was on Solucortef 50mg IV q6hr x 6 days.; d/c'ed  solucortef and started Prednisone taper.   - encouraged incentive spirometry  -cont O2 with 3LNC to maintain Spo2 88-92. Patient does desat to low 80s with ambulation as per PT.   -Pulmonary consult appreciated. Lasix 20mg IV qdaily started for pleural effusions and to help w/ hypoxemia. Monitoring now off bipap.   -c/w spiriva, budesonide - now extubated was on bipap at night  - Patient was on Solucortef 50mg IV q6hr x 6 days.; d/c'ed  solucortef and started Prednisone taper.   - encouraged incentive spirometry  -Increased NC to 3.5 L O2  to maintain Spo2 88-92 since patient still hypoxic. nebulizer scheduled to be given.   -Pulmonary consult appreciated. Lasix 20mg IV qdaily started for pleural effusions and to help w/ hypoxemia. Monitoring now off bipap.   -c/w spiriva, budesonide

## 2021-04-26 NOTE — ED CDU PROVIDER DISPOSITION NOTE - CARE PROVIDER_API CALL
Harish Ronquillo  CARDIOLOGY  [address], [address]  [place] [address]  Phone: [phone]  Fax: [phone]  Follow Up Time: 4-6 Days    [facility]  [id number] [place] [address]  Phone: [phone]  Fax: [phone]  Follow Up Time: 4-6 Days

## 2021-12-07 NOTE — ED ADULT NURSE NOTE - CAS DISCH CONDITION
Detail Level: Detailed Quality 130: Documentation Of Current Medications In The Medical Record: Current Medications Documented Stable

## 2022-03-30 ENCOUNTER — EMERGENCY (EMERGENCY)
Facility: HOSPITAL | Age: 87
LOS: 0 days | Discharge: HOME | End: 2022-03-30
Attending: EMERGENCY MEDICINE | Admitting: EMERGENCY MEDICINE
Payer: MEDICARE

## 2022-03-30 VITALS
TEMPERATURE: 98 F | WEIGHT: 179.9 LBS | DIASTOLIC BLOOD PRESSURE: 102 MMHG | OXYGEN SATURATION: 97 % | HEIGHT: 70 IN | SYSTOLIC BLOOD PRESSURE: 223 MMHG | RESPIRATION RATE: 20 BRPM | HEART RATE: 102 BPM

## 2022-03-30 VITALS
OXYGEN SATURATION: 97 % | HEART RATE: 64 BPM | SYSTOLIC BLOOD PRESSURE: 134 MMHG | RESPIRATION RATE: 18 BRPM | DIASTOLIC BLOOD PRESSURE: 64 MMHG

## 2022-03-30 DIAGNOSIS — Z86.73 PERSONAL HISTORY OF TRANSIENT ISCHEMIC ATTACK (TIA), AND CEREBRAL INFARCTION WITHOUT RESIDUAL DEFICITS: ICD-10-CM

## 2022-03-30 DIAGNOSIS — I10 ESSENTIAL (PRIMARY) HYPERTENSION: ICD-10-CM

## 2022-03-30 DIAGNOSIS — I25.10 ATHEROSCLEROTIC HEART DISEASE OF NATIVE CORONARY ARTERY WITHOUT ANGINA PECTORIS: ICD-10-CM

## 2022-03-30 DIAGNOSIS — R33.9 RETENTION OF URINE, UNSPECIFIED: ICD-10-CM

## 2022-03-30 DIAGNOSIS — Z95.0 PRESENCE OF CARDIAC PACEMAKER: ICD-10-CM

## 2022-03-30 DIAGNOSIS — Z95.1 PRESENCE OF AORTOCORONARY BYPASS GRAFT: ICD-10-CM

## 2022-03-30 DIAGNOSIS — Z79.01 LONG TERM (CURRENT) USE OF ANTICOAGULANTS: ICD-10-CM

## 2022-03-30 DIAGNOSIS — E78.5 HYPERLIPIDEMIA, UNSPECIFIED: ICD-10-CM

## 2022-03-30 DIAGNOSIS — I48.91 UNSPECIFIED ATRIAL FIBRILLATION: ICD-10-CM

## 2022-03-30 LAB
APPEARANCE UR: CLEAR — SIGNIFICANT CHANGE UP
BACTERIA # UR AUTO: NEGATIVE — SIGNIFICANT CHANGE UP
BILIRUB UR-MCNC: NEGATIVE — SIGNIFICANT CHANGE UP
COLOR SPEC: SIGNIFICANT CHANGE UP
DIFF PNL FLD: ABNORMAL
EPI CELLS # UR: 1 /HPF — SIGNIFICANT CHANGE UP (ref 0–5)
GLUCOSE UR QL: NEGATIVE — SIGNIFICANT CHANGE UP
HYALINE CASTS # UR AUTO: 0 /LPF — SIGNIFICANT CHANGE UP (ref 0–7)
KETONES UR-MCNC: NEGATIVE — SIGNIFICANT CHANGE UP
LEUKOCYTE ESTERASE UR-ACNC: NEGATIVE — SIGNIFICANT CHANGE UP
NITRITE UR-MCNC: NEGATIVE — SIGNIFICANT CHANGE UP
PH UR: 6 — SIGNIFICANT CHANGE UP (ref 5–8)
PROT UR-MCNC: NEGATIVE — SIGNIFICANT CHANGE UP
RBC CASTS # UR COMP ASSIST: 1 /HPF — SIGNIFICANT CHANGE UP (ref 0–4)
SP GR SPEC: 1.01 — SIGNIFICANT CHANGE UP (ref 1.01–1.03)
UROBILINOGEN FLD QL: SIGNIFICANT CHANGE UP
WBC UR QL: 1 /HPF — SIGNIFICANT CHANGE UP (ref 0–5)

## 2022-03-30 PROCEDURE — 99283 EMERGENCY DEPT VISIT LOW MDM: CPT | Mod: FS

## 2022-03-30 NOTE — ED PROVIDER NOTE - CLINICAL SUMMARY MEDICAL DECISION MAKING FREE TEXT BOX
86-year-old male with history of HTN, HLD, CAD status post CABG, A. fib on Xarelto, pacemaker in place for about 1 month, BPH, CVA without significant residual deficit, rectal dysfunction intermittently using Viagra, last use 1 week ago presents the ED for urinary retention.  Patient states he was in usual state of health all day yesterday, around 10 PM he urinated and has not been able to urinate since.  He notes abdominal fullness and the urge to urinate.  No chest pain, dyspnea, dizziness.  No recent instrumentation, has never had a Reddy placed.  He denies use of anticholinergic medication, allergy medication over-the-counter medication.    Vital signs notable for elevated blood pressure and heart rate, likely related to urinary retention.  Patient with palpable bladder.  Clear lungs, regular rhythm.    Reddy was placed with return of 1500 cc of clear urine.  Urine culture was sent.  Patient is feeling much better, repeat vital signs normal.    Discussed with patient and wife need to follow-up with urology.  Patient is previously followed with Dr. Pham and his wife notes she will call in the morning to make an appointment.    Patient was right with information on leg bag use, Reddy hygiene.    No signs of UTI, cultures pending.  Will DC home with close monitoring symptoms and return precautions.

## 2022-03-30 NOTE — ED PROVIDER NOTE - NSFOLLOWUPINSTRUCTIONS_ED_ALL_ED_FT
Please follow up with your primary care physician within 24-72 hours and return immediately if symptoms worsen.    Acute Urinary Retention, Male  Acute urinary retention is a condition in which a person is unable to pass urine. This can last for a short time or for a long time. If left untreated, it can result in kidney damage or other serious complications.    What are the causes?  This condition may be caused by:    Obstruction or narrowing of the tube that drains the bladder (urethra). This may be caused by surgery or problems with nearby organs, such as the prostate gland, which can press or squeeze the urethra.  Problems with the nerves in the bladder. These can be caused by diseases, such as multiple sclerosis, or by spinal cord injuries.  Certain medicines.  Tumors in the area of the pelvis, bladder, or urethra.  Diabetes.  Degenerative cognitive conditions such as delirium or dementia.  Bladder or urinary tract infection.  Constipation.  Blood in the urine (hematuria).  Injury to the bladder or urethra.   Psychological (psychogenic) conditions. Someone may hold his urine due to trauma or because he does not want to use the bathroom.    What increases the risk?  This condition is more likely to develop in older men. As men age, their prostate may become larger and may start pressing or squeezing on the bladder or the urethra.    What are the signs or symptoms?  Symptoms of this condition include:    Trouble urinating.  Pain in the lower abdomen.    Symptoms usually come on slowly over a long period of time.    How is this diagnosed?  This condition is diagnosed based on a physical exam and a medical history. You may also have other tests, including:    An ultrasound of the bladder or kidneys or both.  Blood tests.  A urine analysis.  Additional tests may be needed such as an MRI, kidney, or bladder function tests.    How is this treated?  Treatment for this condition may include:    Medicines.  Placing a thin, sterile tube (catheter) into the bladder to drain urine out of the body. This is called an indwelling urinary catheter. After being inserted, the catheter is held in place with a small balloon that is filled with sterile water. Urine drains from the catheter into a collection bag outside of the body.  Behavioral therapy.  Treatment for any underlying conditions.  If needed, you may be treated in the hospital for kidney function problems or to manage other complications.    Follow these instructions at home:  Take over-the-counter and prescription medicines only as told by your health care provider. Avoid certain medicines, such as decongestants, antihistamines, and some prescription medicines. Do not take any medicine unless your health care provider has approved.  If you were given an indwelling urinary catheter, take care of it as told by your health care provider.  Drink enough fluid to keep your urine clear or pale yellow.  If you were prescribed an antibiotic, take it as told by your health care provider. Do not stop taking the antibiotic even if you start to feel better.  Do not use any products that contain nicotine or tobacco, such as cigarettes and e-cigarettes. If you need help quitting, ask your health care provider.  Monitor any changes in your symptoms. Tell your health care provider about any changes.  If instructed, monitor your blood pressure at home. Report changes as told by your health care provider.  Keep all follow-up visits as told by your health care provider. This is important.  Contact a health care provider if:  You have uncomfortable bladder contractions that you cannot control (spasms) or you leak urine with the spasms.  Get help right away if:  You have chills or fever.  You have blood in your urine.  You have a catheter and:    Your catheter stops draining urine.  Your catheter falls out.    Summary  Acute urinary retention is a condition in which a person is unable to pass urine. If left untreated, it can result in kidney damage or other serious complications.  The cause of this condition may include an enlarged prostate. As men age, their prostate gland may become larger and may start pressing or squeezing on the bladder or the urethra.  Treatment for this condition may include medicines and placement of an indwelling urinary catheter.  Monitor any changes in your symptoms. Tell your health care provider about any changes.  This information is not intended to replace advice given to you by your health care provider. Make sure you discuss any questions you have with your health care provider.

## 2022-03-30 NOTE — ED PROVIDER NOTE - NS ED ATTENDING STATEMENT MOD
This was a shared visit with the CHIQUIS. I reviewed and verified the documentation and independently performed the documented:

## 2022-03-30 NOTE — ED PROVIDER NOTE - CARE PROVIDER_API CALL
Homero Pham)  Urology  [facility] [address] [address]  Phone: [phone]  Fax: [phone]  Follow Up Time: 1-3 Days

## 2022-03-30 NOTE — ED PROVIDER NOTE - PRINCIPAL DIAGNOSIS
May 10, 2019       Radha Thomas  [address]  [facility] [address]    Dear Ms. Thomas,    Your procedure is scheduled with Bryan Breaux MD on June 19, 2019, at [facility].  You can expect to be contacted 5 to 7 days prior to the surgery to confirm arrival and surgery time. Occasionally these times may change.    The address of the facility is:    [address] [place] [address]  [phone]    The following appointment(s) have been scheduled for you:     1 day post op with Dr. Bryan Breaux at the [facility] on June 20, 2019 at 8:30 AM     4 weeks post op with Dr. Bryan Breaux at the [facility] on July 26, 2019 at 9:20 AM    Here are instructions for your surgery:     · Do not eat or drink after midnight the night before your surgery.    · You will need someone to drive you home and remain with you up to 24 hours after you have been discharged.     · Starting 10 days prior to your surgery, please do not take any Phentermine or other diet/weight loss products.  Continuing these medications in the 10 days before surgery will likely result in postponement due to anesthesia requirements.  Please consult with your prescribing physicians if you have any questions.    If you have any work related and/or disability forms that need to be completed, please bring these forms to:  [facility]. It takes approximately 7 to 10 business days to complete these forms.    If you have any questions or need to reschedule, please contact me at the telephone number and extension listed below.     Thank you,    Milli [phone]  Surgery Scheduler for Bryan Breaux MD   [facility] Pre-Admit Department        
Urinary retention

## 2022-03-30 NOTE — ED PROVIDER NOTE - PATIENT PORTAL LINK FT
You can access the FollowMyHealth Patient Portal offered by [facility] by registering at the following website: [email or web address]. By joining [employer]’s FollowMyHealth portal, you will also be able to view your health information using other applications (apps) compatible with our system.

## 2022-03-30 NOTE — ED PROVIDER NOTE - NS ED ROS FT
Constitutional: (-) fever  Eyes/ENT: (-) visual changes   Cardiovascular: (-) chest pain, (-) syncope  Respiratory: (-) cough, (-) shortness of breath  Gastrointestinal: (-) vomiting, (-) diarrhea  Genitourinary: (-) dysuria, (-) hesitancy, (-) frequency, urinary retention   Musculoskeletal: (-) neck pain, (-) back pain, (-) joint pain  Integumentary: (-) rash, (-) edema  Neurological: (-) headache, (-) altered mental status  Allergic/Immunologic: (-) pruritus

## 2022-03-31 LAB
CULTURE RESULTS: NO GROWTH — SIGNIFICANT CHANGE UP
SPECIMEN SOURCE: SIGNIFICANT CHANGE UP

## 2022-04-13 ENCOUNTER — EMERGENCY (EMERGENCY)
Facility: HOSPITAL | Age: 87
LOS: 0 days | Discharge: HOME | End: 2022-04-13
Attending: EMERGENCY MEDICINE | Admitting: EMERGENCY MEDICINE
Payer: MEDICARE

## 2022-04-13 VITALS
SYSTOLIC BLOOD PRESSURE: 189 MMHG | TEMPERATURE: 98 F | HEIGHT: 70 IN | DIASTOLIC BLOOD PRESSURE: 88 MMHG | RESPIRATION RATE: 18 BRPM | OXYGEN SATURATION: 95 % | HEART RATE: 82 BPM | WEIGHT: 190.04 LBS

## 2022-04-13 DIAGNOSIS — R10.30 LOWER ABDOMINAL PAIN, UNSPECIFIED: ICD-10-CM

## 2022-04-13 DIAGNOSIS — Z79.01 LONG TERM (CURRENT) USE OF ANTICOAGULANTS: ICD-10-CM

## 2022-04-13 DIAGNOSIS — Z86.73 PERSONAL HISTORY OF TRANSIENT ISCHEMIC ATTACK (TIA), AND CEREBRAL INFARCTION WITHOUT RESIDUAL DEFICITS: ICD-10-CM

## 2022-04-13 DIAGNOSIS — Z95.0 PRESENCE OF CARDIAC PACEMAKER: ICD-10-CM

## 2022-04-13 DIAGNOSIS — N40.0 BENIGN PROSTATIC HYPERPLASIA WITHOUT LOWER URINARY TRACT SYMPTOMS: ICD-10-CM

## 2022-04-13 DIAGNOSIS — I48.91 UNSPECIFIED ATRIAL FIBRILLATION: ICD-10-CM

## 2022-04-13 DIAGNOSIS — I25.10 ATHEROSCLEROTIC HEART DISEASE OF NATIVE CORONARY ARTERY WITHOUT ANGINA PECTORIS: ICD-10-CM

## 2022-04-13 DIAGNOSIS — E78.5 HYPERLIPIDEMIA, UNSPECIFIED: ICD-10-CM

## 2022-04-13 DIAGNOSIS — I10 ESSENTIAL (PRIMARY) HYPERTENSION: ICD-10-CM

## 2022-04-13 DIAGNOSIS — R33.9 RETENTION OF URINE, UNSPECIFIED: ICD-10-CM

## 2022-04-13 PROCEDURE — 99284 EMERGENCY DEPT VISIT MOD MDM: CPT | Mod: FS

## 2022-04-13 NOTE — ED PROVIDER NOTE - PROVIDER TOKENS
PROVIDER:[TOKEN:[[id number]],FOLLOWUP:[1-3 Days]],PROVIDER:[TOKEN:[[id number]],FOLLOWUP:[1-3 Days]]

## 2022-04-13 NOTE — ED PROVIDER NOTE - PROGRESS NOTE DETAILS
FF: pt has braun in place. urine output about 500cc. will f/u with urology. given referral for nephrologist agreeable to dc. FF: bedside ultrasound of bladder performed by ultrasound team. pt has braun in place. urine output about 500cc. will f/u with urology. pt reports suprapubic discomfort has resolved. given referral for nephrologist agreeable to dc.

## 2022-04-13 NOTE — ED PROVIDER NOTE - CARE PROVIDERS DIRECT ADDRESSES
[email or web address]

## 2022-04-13 NOTE — ED PROVIDER NOTE - PHYSICAL EXAMINATION
Physical Exam    Vital Signs: I have reviewed the initial vital signs.  Constitutional: well-nourished, appears stated age, no acute distress  Eyes: Conjunctiva pink, Sclera clear  Cardiovascular: S1 and S2, regular rate, regular rhythm, well-perfused extremities, radial pulses equal and 2+ b/l.   Respiratory: unlabored respiratory effort, clear to auscultation bilaterally no wheezing, rales and rhonchi. pt is speaking full sentences. no accessory muscle use.   Gastrointestinal: soft, (+) mild suprapubic tenderness, nondistended abdomen, no pulsatile mass, normal bowl sounds, no rebound, no guarding, no cva tenderness  Musculoskeletal: supple neck, no lower extremity edema, no calf tenderness  Integumentary: warm, dry, no rash  Neurologic: awake, alert, cranial nerves II-XII grossly intact, extremities’ motor and sensory functions grossly intact. steady gait.   Psychiatric: appropriate mood, appropriate affect

## 2022-04-13 NOTE — ED PROVIDER NOTE - NSFOLLOWUPINSTRUCTIONS_ED_ALL_ED_FT
Urinary Retention    Acute urinary retention is the temporary inability to urinate. This is a common problem in older men. As men age their prostates become larger and block the flow of urine from the bladder. If you are sent home with a braun catheter and a drainage system make sure to keep the drainage bag emptied and lower than your catheter. Keep the braun catheter in until you follow up with a urologist.    There are two main types of drainage bags. One is a large bag that usually is used at night. It has a good capacity that will allow you to sleep through the night without having to empty it. The second type is called a leg bag. It has a smaller capacity, so it needs to be emptied more frequently. However, the main advantage is that it can be attached by a leg strap and can go underneath your clothing, allowing you the freedom to move about or leave your home.     SEEK IMMEDIATE MEDICAL CARE IF YOU DEVELOP THE FOLLOWING SYMPTOMS: the catheter stops draining urine, the catheter falls out, abdominal pain, nausea/vomiting, or chills/fever.

## 2022-04-13 NOTE — ED PROVIDER NOTE - PATIENT PORTAL LINK FT
You can access the FollowMyHealth Patient Portal offered by [facility] by registering at the following website: [email or web address]. By joining [employer]’s FollowMyHealth portal, you will also be able to view your health information using other applications (apps) compatible with our system.

## 2022-04-13 NOTE — ED PROVIDER NOTE - NPI NUMBER (FOR SYSADMIN USE ONLY) :
Vaccine Information Statement(s) was given today. This has been reviewed, questions answered, and verbal consent given by Patient for injection(s) and administration of Influenza (Inactivated).      Patient tolerated without incident. See immunization grid for documentation.         [[id number]],[[id number]]

## 2022-04-13 NOTE — ED PROVIDER NOTE - CLINICAL SUMMARY MEDICAL DECISION MAKING FREE TEXT BOX
Patient presented status post Redyd catheter removal with recurrent urinary retention, stating he is unable to urinate since 1 PM today.  Otherwise on arrival patient afebrile, hemodynamically stable, very uncomfortable secondary to suprapubic abdominal pressure.  Reddy catheter placed by RN in ED without complication after which time patient felt much better.  Given the above, will discharge home with outpatient follow up. Patient agreeable with plan. Agrees to return to ED for any new or worsening symptoms.

## 2022-04-13 NOTE — ED ADULT TRIAGE NOTE - CCCP TRG CHIEF CMPLNT
Addendum  created 10/23/18 0902 by Carolyn Amaro MD    Sign clinical note       urinary incontinence

## 2022-04-13 NOTE — ED PROVIDER NOTE - OBJECTIVE STATEMENT
87 y/o male with a PMH of HTN, HLD, CAD, CVA, Afib on xarelto, pacemaker, and BPH follow by urologist Dr. Pham presents to the ED for urinary retention. pt reports he had a braun catheter placed 03/30/22, and it was removed in Dr. Pham's office yesterday. pt reports in the office he urinated little dribbles, then went home and has not had a full stream of urine. pt reports around 1pm a little dribble of urine. pt reports suprapubic discomfort that began today. pt denies fever, chills, blood in the urine, burning or pain with urine, urinary frequency, n/v/d/c, testicular pain, back pain, chest pain, or sob.

## 2022-04-13 NOTE — ED PROVIDER NOTE - CARE PROVIDER_API CALL
Héctor Guaman)  Urology  [address], [address]  [place] [address]  Phone: [phone]  Fax: [phone]  Follow Up Time: 1-3 Days    Brice Boo)  Internal Medicine; Nephrology  [address] [place] [address]  Phone: [phone]  Fax: [phone]  Follow Up Time: 1-3 Days

## 2022-04-28 ENCOUNTER — APPOINTMENT (OUTPATIENT)
Dept: UROLOGY | Facility: CLINIC | Age: 87
End: 2022-04-28
Payer: MEDICARE

## 2022-04-28 VITALS — HEIGHT: 70 IN | WEIGHT: 193 LBS | BODY MASS INDEX: 27.63 KG/M2

## 2022-04-28 DIAGNOSIS — R33.9 RETENTION OF URINE, UNSPECIFIED: ICD-10-CM

## 2022-04-28 DIAGNOSIS — E07.9 DISORDER OF THYROID, UNSPECIFIED: ICD-10-CM

## 2022-04-28 DIAGNOSIS — I10 ESSENTIAL (PRIMARY) HYPERTENSION: ICD-10-CM

## 2022-04-28 DIAGNOSIS — N13.8 BENIGN PROSTATIC HYPERPLASIA WITH LOWER URINARY TRACT SYMPMS: ICD-10-CM

## 2022-04-28 DIAGNOSIS — N40.1 BENIGN PROSTATIC HYPERPLASIA WITH LOWER URINARY TRACT SYMPMS: ICD-10-CM

## 2022-04-28 DIAGNOSIS — Z87.891 PERSONAL HISTORY OF NICOTINE DEPENDENCE: ICD-10-CM

## 2022-04-28 DIAGNOSIS — Z95.0 PRESENCE OF CARDIAC PACEMAKER: ICD-10-CM

## 2022-04-28 DIAGNOSIS — Z78.9 OTHER SPECIFIED HEALTH STATUS: ICD-10-CM

## 2022-04-28 PROCEDURE — 99204 OFFICE O/P NEW MOD 45 MIN: CPT

## 2022-04-28 RX ORDER — METOPROLOL TARTRATE 25 MG/1
25 TABLET, FILM COATED ORAL
Refills: 0 | Status: ACTIVE | COMMUNITY

## 2022-04-28 RX ORDER — LISINOPRIL 40 MG/1
40 TABLET ORAL
Refills: 0 | Status: ACTIVE | COMMUNITY

## 2022-04-28 RX ORDER — SILODOSIN 8 MG/1
8 CAPSULE ORAL
Refills: 0 | Status: ACTIVE | COMMUNITY

## 2022-04-28 RX ORDER — RIVAROXABAN 10 MG/1
10 TABLET, FILM COATED ORAL
Refills: 0 | Status: ACTIVE | COMMUNITY

## 2022-04-28 RX ORDER — ALLOPURINOL 100 MG/1
100 TABLET ORAL
Refills: 0 | Status: ACTIVE | COMMUNITY

## 2022-04-28 RX ORDER — FUROSEMIDE 20 MG/1
20 TABLET ORAL
Refills: 0 | Status: ACTIVE | COMMUNITY

## 2022-04-28 RX ORDER — LEVOTHYROXINE SODIUM 0.09 MG/1
88 TABLET ORAL
Refills: 0 | Status: ACTIVE | COMMUNITY

## 2022-04-28 RX ORDER — CETIRIZINE HCL 10 MG
10 TABLET ORAL
Refills: 0 | Status: ACTIVE | COMMUNITY

## 2022-04-28 NOTE — PHYSICAL EXAM
[General Appearance - In No Acute Distress] : no acute distress [] : no respiratory distress [Abdomen Soft] : soft [Abdomen Tenderness] : non-tender [Urethral Meatus] : meatus normal [Penis Abnormality] : normal uncircumcised penis [Normal Station and Gait] : the gait and station were normal for the patient's age [Oriented To Time, Place, And Person] : oriented to person, place, and time

## 2022-05-01 PROBLEM — R33.9 URINARY RETENTION: Status: ACTIVE | Noted: 2022-05-01

## 2022-05-01 PROBLEM — N40.1 BENIGN LOCALIZED HYPERPLASIA OF PROSTATE WITH URINARY OBSTRUCTION: Status: ACTIVE | Noted: 2022-05-01

## 2022-05-01 NOTE — ASSESSMENT
[FreeTextEntry1] : 86-year-old male with incomplete bladder emptying currently doing CIC 3 times daily.  He is following with Dr. Pham. He reports that he has a urodynamic test scheduled next week.\par \par Given his age and comorbidities, urodynamics will be a reasonable next step.  I reviewed with patient possible causes for incomplete bladder emptying–urinary retention.  Urodynamics will be able to evaluate bladder outlet obstruction index as well as strength of bladder contractility.\par \par I discussed with patient different prostate procedures and the risks associated with these prostate procedures.  Patient is on Xarelto, has high blood pressure taking 3 antihypertensives.\par \par I recommend the patient continue with silodosin and CIC.  I recommend he follow-up with Dr. Pham for urodynamic testing.\par \par Given his age and comorbidities, PSA can continue to be observed.\par \par Patient can follow-up as needed.

## 2022-05-01 NOTE — ADDENDUM
[FreeTextEntry1] : Documented by JERONIMO Roman acting as a scribe for Dr. Héctor Guaman \par \par All medical record entries made by the Scribe were at my, Dr. Guaman direction and\par personally dictated by me.  I have reviewed the chart and agree that the record\par accurately reflects my personal performance of the history, physical exam, procedure and imaging.  \par  \par \par

## 2022-05-01 NOTE — HISTORY OF PRESENT ILLNESS
[FreeTextEntry1] : This an 86-year-old male with recent multiple ER visits for urinary retention requiring indwelling Reddy catheter.  Patient is currently following with Dr. Pham.  He was taught CIC and is currently doing this 3 times daily.  Patient presents to office for second opinion.\par \par Patient reports that he has been having slow urinary stream for at least a year.  He states that when he urinates he can only get dribbles out at a time.  He states that he last catheterized himself at 10 AM this morning.  Bladder scan reveals 256 mL and  mL.  He confirms that he has a follow-up scheduled for urodynamics with his other urologist.\par \par Patient has multiple comorbidities including an indwelling pacemaker and he is on Xarelto daily.  He is on 3 separate blood pressure medications.  He is currently taking Silodosin. \par \par Patient also has history of elevated PSA.  Patient states his PSA has been around the range of 7 for "many years ".  His most recent PSA in April 24 was 11.  This was during the time he was going to the ER and having catheters inserted.

## 2023-01-26 NOTE — ED PROVIDER NOTE - OBJECTIVE STATEMENT
87 yo male with a pmh of HTN, HLD, CAD s/p CABG, afib on Xarelto, s/p pacemaker placement (approx. one month ago), CVA, BPH, and gout presents c/o urinary retention. pt states to have not been able to urinate since 10pm. pt denies any other symptoms including fevers, chill, headache, recent illness/travel, cough, abdominal pain, chest pain, or SOB.
Yes...

## 2023-02-09 NOTE — ED ADULT NURSE NOTE - PAIN: PRESENCE, MLM
complains of pain/discomfort Quality 130: Documentation Of Current Medications In The Medical Record: Current Medications Documented Quality 431: Preventive Care And Screening: Unhealthy Alcohol Use - Screening: Patient not identified as an unhealthy alcohol user when screened for unhealthy alcohol use using a systematic screening method Detail Level: Detailed Quality 226: Preventive Care And Screening: Tobacco Use: Screening And Cessation Intervention: Patient screened for tobacco use and is an ex/non-smoker

## 2023-05-09 NOTE — ED ADULT NURSE NOTE - GENITOURINARY ASSESSMENT
No adverse events during shift. Patient NPO for OR today. Patient A&O x3 with Osuna in place. Report given to pre-op RN and pre-op vitals checked. Spoke with patient's son (who is a MD), who would like to be called during the consent signing so he can explain the procedure to his mom in non-medical terms; pre-op aware. Patient left by bed in stable condition.   - - -

## 2023-06-25 NOTE — ED ADULT NURSE NOTE - CAS EDN DISCHARGE INTERVENTIONS
Lactation visit attempted and Vivi is sleeping. Per RN, breastfeeding has been going well. No concerns.     Tiffany Tong, NICO, IBCLC   IV discontinued, cath removed intact/by MD

## 2023-08-13 ENCOUNTER — EMERGENCY (EMERGENCY)
Facility: HOSPITAL | Age: 88
LOS: 0 days | Discharge: LEFT BEFORE DISCHARGE | End: 2023-08-13
Attending: EMERGENCY MEDICINE
Payer: MEDICARE

## 2023-08-13 VITALS
RESPIRATION RATE: 18 BRPM | HEART RATE: 60 BPM | OXYGEN SATURATION: 100 % | SYSTOLIC BLOOD PRESSURE: 171 MMHG | DIASTOLIC BLOOD PRESSURE: 77 MMHG | TEMPERATURE: 98 F

## 2023-08-13 VITALS
HEIGHT: 70 IN | DIASTOLIC BLOOD PRESSURE: 91 MMHG | TEMPERATURE: 98 F | SYSTOLIC BLOOD PRESSURE: 145 MMHG | OXYGEN SATURATION: 96 % | HEART RATE: 81 BPM | RESPIRATION RATE: 18 BRPM | WEIGHT: 182.98 LBS

## 2023-08-13 DIAGNOSIS — I25.10 ATHEROSCLEROTIC HEART DISEASE OF NATIVE CORONARY ARTERY WITHOUT ANGINA PECTORIS: ICD-10-CM

## 2023-08-13 DIAGNOSIS — R19.7 DIARRHEA, UNSPECIFIED: ICD-10-CM

## 2023-08-13 DIAGNOSIS — R10.31 RIGHT LOWER QUADRANT PAIN: ICD-10-CM

## 2023-08-13 DIAGNOSIS — Z53.21 PROCEDURE AND TREATMENT NOT CARRIED OUT DUE TO PATIENT LEAVING PRIOR TO BEING SEEN BY HEALTH CARE PROVIDER: ICD-10-CM

## 2023-08-13 DIAGNOSIS — N50.811 RIGHT TESTICULAR PAIN: ICD-10-CM

## 2023-08-13 DIAGNOSIS — E03.9 HYPOTHYROIDISM, UNSPECIFIED: ICD-10-CM

## 2023-08-13 DIAGNOSIS — Z87.440 PERSONAL HISTORY OF URINARY (TRACT) INFECTIONS: ICD-10-CM

## 2023-08-13 DIAGNOSIS — I10 ESSENTIAL (PRIMARY) HYPERTENSION: ICD-10-CM

## 2023-08-13 DIAGNOSIS — I48.91 UNSPECIFIED ATRIAL FIBRILLATION: ICD-10-CM

## 2023-08-13 DIAGNOSIS — Z86.73 PERSONAL HISTORY OF TRANSIENT ISCHEMIC ATTACK (TIA), AND CEREBRAL INFARCTION WITHOUT RESIDUAL DEFICITS: ICD-10-CM

## 2023-08-13 DIAGNOSIS — N40.0 BENIGN PROSTATIC HYPERPLASIA WITHOUT LOWER URINARY TRACT SYMPTOMS: ICD-10-CM

## 2023-08-13 DIAGNOSIS — Z79.01 LONG TERM (CURRENT) USE OF ANTICOAGULANTS: ICD-10-CM

## 2023-08-13 DIAGNOSIS — Z95.1 PRESENCE OF AORTOCORONARY BYPASS GRAFT: ICD-10-CM

## 2023-08-13 LAB
ALBUMIN SERPL ELPH-MCNC: 4.5 G/DL — SIGNIFICANT CHANGE UP (ref 3.5–5.2)
ALP SERPL-CCNC: 96 U/L — SIGNIFICANT CHANGE UP (ref 30–115)
ALT FLD-CCNC: 30 U/L — SIGNIFICANT CHANGE UP (ref 0–41)
ANION GAP SERPL CALC-SCNC: 12 MMOL/L — SIGNIFICANT CHANGE UP (ref 7–14)
APPEARANCE UR: CLEAR — SIGNIFICANT CHANGE UP
AST SERPL-CCNC: 29 U/L — SIGNIFICANT CHANGE UP (ref 0–41)
BASOPHILS # BLD AUTO: 0.06 K/UL — SIGNIFICANT CHANGE UP (ref 0–0.2)
BASOPHILS NFR BLD AUTO: 1.1 % — HIGH (ref 0–1)
BILIRUB SERPL-MCNC: 0.5 MG/DL — SIGNIFICANT CHANGE UP (ref 0.2–1.2)
BILIRUB UR-MCNC: NEGATIVE — SIGNIFICANT CHANGE UP
BUN SERPL-MCNC: 26 MG/DL — HIGH (ref 10–20)
CALCIUM SERPL-MCNC: 9.3 MG/DL — SIGNIFICANT CHANGE UP (ref 8.4–10.5)
CHLORIDE SERPL-SCNC: 106 MMOL/L — SIGNIFICANT CHANGE UP (ref 98–110)
CO2 SERPL-SCNC: 22 MMOL/L — SIGNIFICANT CHANGE UP (ref 17–32)
COLOR SPEC: YELLOW — SIGNIFICANT CHANGE UP
CREAT SERPL-MCNC: 1.1 MG/DL — SIGNIFICANT CHANGE UP (ref 0.7–1.5)
DIFF PNL FLD: NEGATIVE — SIGNIFICANT CHANGE UP
EGFR: 65 ML/MIN/1.73M2 — SIGNIFICANT CHANGE UP
EOSINOPHIL # BLD AUTO: 0.12 K/UL — SIGNIFICANT CHANGE UP (ref 0–0.7)
EOSINOPHIL NFR BLD AUTO: 2.1 % — SIGNIFICANT CHANGE UP (ref 0–8)
GLUCOSE SERPL-MCNC: 88 MG/DL — SIGNIFICANT CHANGE UP (ref 70–99)
GLUCOSE UR QL: NEGATIVE MG/DL — SIGNIFICANT CHANGE UP
HCT VFR BLD CALC: 42.9 % — SIGNIFICANT CHANGE UP (ref 42–52)
HGB BLD-MCNC: 14.1 G/DL — SIGNIFICANT CHANGE UP (ref 14–18)
IMM GRANULOCYTES NFR BLD AUTO: 0.4 % — HIGH (ref 0.1–0.3)
KETONES UR-MCNC: NEGATIVE MG/DL — SIGNIFICANT CHANGE UP
LEUKOCYTE ESTERASE UR-ACNC: NEGATIVE — SIGNIFICANT CHANGE UP
LYMPHOCYTES # BLD AUTO: 1.49 K/UL — SIGNIFICANT CHANGE UP (ref 1.2–3.4)
LYMPHOCYTES # BLD AUTO: 26.4 % — SIGNIFICANT CHANGE UP (ref 20.5–51.1)
MCHC RBC-ENTMCNC: 30.4 PG — SIGNIFICANT CHANGE UP (ref 27–31)
MCHC RBC-ENTMCNC: 32.9 G/DL — SIGNIFICANT CHANGE UP (ref 32–37)
MCV RBC AUTO: 92.5 FL — SIGNIFICANT CHANGE UP (ref 80–94)
MONOCYTES # BLD AUTO: 0.44 K/UL — SIGNIFICANT CHANGE UP (ref 0.1–0.6)
MONOCYTES NFR BLD AUTO: 7.8 % — SIGNIFICANT CHANGE UP (ref 1.7–9.3)
NEUTROPHILS # BLD AUTO: 3.52 K/UL — SIGNIFICANT CHANGE UP (ref 1.4–6.5)
NEUTROPHILS NFR BLD AUTO: 62.2 % — SIGNIFICANT CHANGE UP (ref 42.2–75.2)
NITRITE UR-MCNC: NEGATIVE — SIGNIFICANT CHANGE UP
NRBC # BLD: 0 /100 WBCS — SIGNIFICANT CHANGE UP (ref 0–0)
PH UR: 5.5 — SIGNIFICANT CHANGE UP (ref 5–8)
PLATELET # BLD AUTO: 197 K/UL — SIGNIFICANT CHANGE UP (ref 130–400)
PMV BLD: 9.9 FL — SIGNIFICANT CHANGE UP (ref 7.4–10.4)
POTASSIUM SERPL-MCNC: 4.5 MMOL/L — SIGNIFICANT CHANGE UP (ref 3.5–5)
POTASSIUM SERPL-SCNC: 4.5 MMOL/L — SIGNIFICANT CHANGE UP (ref 3.5–5)
PROT SERPL-MCNC: 6.6 G/DL — SIGNIFICANT CHANGE UP (ref 6–8)
PROT UR-MCNC: NEGATIVE MG/DL — SIGNIFICANT CHANGE UP
RBC # BLD: 4.64 M/UL — LOW (ref 4.7–6.1)
RBC # FLD: 14.1 % — SIGNIFICANT CHANGE UP (ref 11.5–14.5)
SODIUM SERPL-SCNC: 140 MMOL/L — SIGNIFICANT CHANGE UP (ref 135–146)
SP GR SPEC: 1.01 — SIGNIFICANT CHANGE UP (ref 1–1.03)
UROBILINOGEN FLD QL: 0.2 MG/DL — SIGNIFICANT CHANGE UP (ref 0.2–1)
WBC # BLD: 5.65 K/UL — SIGNIFICANT CHANGE UP (ref 4.8–10.8)
WBC # FLD AUTO: 5.65 K/UL — SIGNIFICANT CHANGE UP (ref 4.8–10.8)

## 2023-08-13 PROCEDURE — 81003 URINALYSIS AUTO W/O SCOPE: CPT

## 2023-08-13 PROCEDURE — 80053 COMPREHEN METABOLIC PANEL: CPT

## 2023-08-13 PROCEDURE — 87186 SC STD MICRODIL/AGAR DIL: CPT

## 2023-08-13 PROCEDURE — 76870 US EXAM SCROTUM: CPT | Mod: 26

## 2023-08-13 PROCEDURE — 85025 COMPLETE CBC W/AUTO DIFF WBC: CPT

## 2023-08-13 PROCEDURE — 36415 COLL VENOUS BLD VENIPUNCTURE: CPT

## 2023-08-13 PROCEDURE — 99284 EMERGENCY DEPT VISIT MOD MDM: CPT | Mod: 25

## 2023-08-13 PROCEDURE — 76870 US EXAM SCROTUM: CPT

## 2023-08-13 PROCEDURE — 87077 CULTURE AEROBIC IDENTIFY: CPT

## 2023-08-13 PROCEDURE — 99284 EMERGENCY DEPT VISIT MOD MDM: CPT | Mod: FS

## 2023-08-13 PROCEDURE — 87086 URINE CULTURE/COLONY COUNT: CPT

## 2023-08-13 NOTE — ED PROVIDER NOTE - PHYSICAL EXAMINATION
Physical Exam    Constitutional: No acute distress.   Eyes: Conjunctiva pink, Sclera clear, PERRLA, EOMI.  ENT: No sinus tenderness. No nasal discharge. No oropharyngeal erythema, edema, or exudates. Uvula midline.   Cardiovascular: Regular rate, regular rhythm. No noted murmurs rubs or gallops.  Respiratory: unlabored respiratory effort, clear to auscultation bilaterally no wheezing, rales or rhonchi  Gastrointestinal: Normal bowel sounds. soft, non distended, non-tender abdomen.   : No noted hernia, ulcerations, vesicles, pustules or penile discharge. Scrotum intact testicles with normal lie. right testicle epididymal tenderness to palpation. No testicular tenderness.   Musculoskeletal: supple neck, no midline tenderness. No joint or bony deformity.   Integumentary: warm, dry, no rash  Neurologic: awake, alert, cranial nerves II-XII grossly intact, extremities’ motor and sensory functions grossly intact  Psychiatric: appropriate mood, appropriate affect

## 2023-08-13 NOTE — ED PROVIDER NOTE - PATIENT PORTAL LINK FT
You can access the FollowMyHealth Patient Portal offered by [facility] by registering at the following website: [email or web address]. By joining [employer]’s FollowMyHealth portal, you will also be able to view your health information using other applications (apps) compatible with our system.

## 2023-08-13 NOTE — ED PROVIDER NOTE - PROGRESS NOTE DETAILS
Patient needs to leave the hospital to attend his friends . US pending results at this time. He understands the risks of leaving without results and treatment. ALESHA JERONIMO Persaud - Called patient back regarding US results and referred him to Dr. Rodriguez. Patient states that he will make a follow up appointment.

## 2023-08-13 NOTE — ED ADULT NURSE NOTE - NSFALLUNIVINTERV_ED_ALL_ED
Bed/Stretcher in lowest position, wheels locked, appropriate side rails in place/Call bell, personal items and telephone in reach/Instruct patient to call for assistance before getting out of bed/chair/stretcher/Non-slip footwear applied when patient is off stretcher/[place] to call system/Physically safe environment - no spills, clutter or unnecessary equipment/Purposeful proactive rounding/Room/bathroom lighting operational, light cord in reach

## 2023-08-13 NOTE — ED PROVIDER NOTE - CLINICAL SUMMARY MEDICAL DECISION MAKING FREE TEXT BOX
87yM recurrent UTIs/self-cath p/w groin pain w/o fever, dysuria, hematuria (though w/ intermittent diarrhea).  Pt nontoxic appearing and abd soft/benign.  Labs reassuring.  UA w/o UTI.  US obtained but pt left AMA prior to results as he had to attend a .    The patient wishes to leave against medical advice.  I have discussed the risks, benefits and alternatives (including the possibility of worsening of disease, pain, permanent disability, and/or death) with the patient and his/her family (if available).  The patient voices understanding of these risks, benefits, and alternatives and still wishes to sign out against medical advice.  The patient is awake, alert, oriented x 3 and has demonstrated capacity to refuse/direct care.  I have advised the patient that they can and should return immediately should they develop any worse/different/additional symptoms, or if they change their mind and want to continue their care. Patient has full capacity and has verbalized understanding.  Given detailed returned precautions.

## 2023-08-13 NOTE — ED PROVIDER NOTE - NSFOLLOWUPINSTRUCTIONS_ED_ALL_ED_FT
Please follow up with your Urologist within 1 week.         Groin Pain    WHAT YOU NEED TO KNOW:    Groin pain may be felt only in your groin, or it may spread to your buttocks, thigh, or knee. An injury to your hip joint, pelvic area, lower back, or thighs can cause groin pain.    DISCHARGE INSTRUCTIONS:    Medicines: You may need any of the following:     NSAIDs, such as ibuprofen, help decrease swelling, pain, and fever. This medicine is available with or without a doctor's order. NSAIDs can cause stomach bleeding or kidney problems in certain people. If you take blood thinner medicine, always ask if NSAIDs are safe for you. Always read the medicine label and follow directions. Do not give these medicines to children under 6 months of age without direction from your child's healthcare provider.      Acetaminophen decreases pain. It is available without a doctor's order. Ask how much to take and how often to take it. Follow directions. Acetaminophen can cause liver damage if not taken correctly.       Take your medicine as directed. Contact your healthcare provider if you think your medicine is not helping or if you have side effects. Tell him of her if you are allergic to any medicine. Keep a list of the medicines, vitamins, and herbs you take. Include the amounts, and when and why you take them. Bring the list or the pill bottles to follow-up visits. Carry your medicine list with you in case of an emergency.    Follow up with your healthcare provider as directed: You may need to return for more tests. Write down your questions so you remember to ask them during your visits.     Self-care:     Rest as much as possible. Avoid activities that cause or increase your pain.      Apply ice on your groin for 15 to 20 minutes every hour or as directed. Use an ice pack, or put crushed ice in a plastic bag. Cover it with a towel. Ice helps prevent tissue damage and decreases swelling and pain.       Apply heat on your groin for 20 to 30 minutes every 2 hours for as many days as directed. Heat helps decrease pain and muscle spasms.     Contact your healthcare provider if:     You have a fever.       You have questions or concerns about your condition or care.    Return to the emergency department if:     You have severe pain even after you take medicine.       You have pain or burning when you urinate.       You have pain on your side that spreads to your groin.         © Copyright [employer] 2019 All illustrations and images included in CareNotes are the copyrighted property of A.D.A.M., Inc. or [employer].

## 2023-08-13 NOTE — ED PROVIDER NOTE - OBJECTIVE STATEMENT
87 year old male with a history of BPH Self Catheterizes 4x daily, Recurrent UTIs, Hypothyroid, CAD s/p CABG x 4 vessel, PPM, Afib on Xarelto, CVA, presents to the ED with groin pain. For the past few weeks patient has had intermittent pain in right groin and testicle 2/10 intensity and described as punching/pulling. He states that he has recurrent UTIs for the past few months been on multiple abx now on maintenance Methenamine. He denies UTI symptoms at this time and hematuria. Further denies genital lesions, penile discharge, perineal pain. He also reports intermittent diarrhea over the past few weeks which has overall improved, now occurs every other day. Denies abd pain, vomiting and bloody stools.

## 2023-08-13 NOTE — ED ADULT TRIAGE NOTE - CHIEF COMPLAINT QUOTE
C/o intermittent right groin pain and right testicular pain x 1 month. Pt also reports diarrhea x 2 weeks, patient on recent antibiotics regime for UTI

## 2023-08-13 NOTE — ED PROVIDER NOTE - ATTENDING APP SHARED VISIT CONTRIBUTION OF CARE
87yM CAD afib on xarelto recurrent UTIs and daily self cath (4x/day) hypothyroid CVA p/w groin pain.  No fever, dysuria, hematuria and pt has been on daily methenamine for maintenance abx given frequent recurrent UTIs.  +intermittent diarrhea x weeks, now improving.  No abd pain, n/v.

## 2023-08-18 RX ORDER — NITROFURANTOIN MACROCRYSTAL 50 MG
1 CAPSULE ORAL
Qty: 14 | Refills: 0
Start: 2023-08-18 | End: 2023-08-24

## 2023-10-12 ENCOUNTER — APPOINTMENT (OUTPATIENT)
Dept: SURGERY | Facility: CLINIC | Age: 88
End: 2023-10-12
Payer: MEDICARE

## 2023-10-12 VITALS — BODY MASS INDEX: 27.2 KG/M2 | HEIGHT: 70 IN | WEIGHT: 190 LBS

## 2023-10-12 PROCEDURE — 99203 OFFICE O/P NEW LOW 30 MIN: CPT

## 2023-10-26 ENCOUNTER — RESULT REVIEW (OUTPATIENT)
Age: 88
End: 2023-10-26

## 2023-10-26 ENCOUNTER — OUTPATIENT (OUTPATIENT)
Dept: OUTPATIENT SERVICES | Facility: HOSPITAL | Age: 88
LOS: 1 days | End: 2023-10-26
Payer: MEDICARE

## 2023-10-26 VITALS
DIASTOLIC BLOOD PRESSURE: 61 MMHG | WEIGHT: 189.6 LBS | OXYGEN SATURATION: 100 % | TEMPERATURE: 98 F | HEIGHT: 70 IN | SYSTOLIC BLOOD PRESSURE: 133 MMHG | RESPIRATION RATE: 18 BRPM | HEART RATE: 74 BPM

## 2023-10-26 DIAGNOSIS — Z95.1 PRESENCE OF AORTOCORONARY BYPASS GRAFT: Chronic | ICD-10-CM

## 2023-10-26 DIAGNOSIS — Z95.0 PRESENCE OF CARDIAC PACEMAKER: Chronic | ICD-10-CM

## 2023-10-26 DIAGNOSIS — K40.90 UNILATERAL INGUINAL HERNIA, WITHOUT OBSTRUCTION OR GANGRENE, NOT SPECIFIED AS RECURRENT: ICD-10-CM

## 2023-10-26 DIAGNOSIS — Z01.818 ENCOUNTER FOR OTHER PREPROCEDURAL EXAMINATION: ICD-10-CM

## 2023-10-26 LAB
ALBUMIN SERPL ELPH-MCNC: 4.7 G/DL — SIGNIFICANT CHANGE UP (ref 3.5–5.2)
ALBUMIN SERPL ELPH-MCNC: 4.7 G/DL — SIGNIFICANT CHANGE UP (ref 3.5–5.2)
ALP SERPL-CCNC: 107 U/L — SIGNIFICANT CHANGE UP (ref 30–115)
ALP SERPL-CCNC: 107 U/L — SIGNIFICANT CHANGE UP (ref 30–115)
ALT FLD-CCNC: 23 U/L — SIGNIFICANT CHANGE UP (ref 0–41)
ALT FLD-CCNC: 23 U/L — SIGNIFICANT CHANGE UP (ref 0–41)
ANION GAP SERPL CALC-SCNC: 13 MMOL/L — SIGNIFICANT CHANGE UP (ref 7–14)
ANION GAP SERPL CALC-SCNC: 13 MMOL/L — SIGNIFICANT CHANGE UP (ref 7–14)
APPEARANCE UR: CLEAR — SIGNIFICANT CHANGE UP
APPEARANCE UR: CLEAR — SIGNIFICANT CHANGE UP
APTT BLD: 41.4 SEC — HIGH (ref 27–39.2)
APTT BLD: 41.4 SEC — HIGH (ref 27–39.2)
AST SERPL-CCNC: 24 U/L — SIGNIFICANT CHANGE UP (ref 0–41)
AST SERPL-CCNC: 24 U/L — SIGNIFICANT CHANGE UP (ref 0–41)
BACTERIA # UR AUTO: NEGATIVE /HPF — SIGNIFICANT CHANGE UP
BACTERIA # UR AUTO: NEGATIVE /HPF — SIGNIFICANT CHANGE UP
BASOPHILS # BLD AUTO: 0.07 K/UL — SIGNIFICANT CHANGE UP (ref 0–0.2)
BASOPHILS # BLD AUTO: 0.07 K/UL — SIGNIFICANT CHANGE UP (ref 0–0.2)
BASOPHILS NFR BLD AUTO: 1.2 % — HIGH (ref 0–1)
BASOPHILS NFR BLD AUTO: 1.2 % — HIGH (ref 0–1)
BILIRUB SERPL-MCNC: 0.8 MG/DL — SIGNIFICANT CHANGE UP (ref 0.2–1.2)
BILIRUB SERPL-MCNC: 0.8 MG/DL — SIGNIFICANT CHANGE UP (ref 0.2–1.2)
BILIRUB UR-MCNC: NEGATIVE — SIGNIFICANT CHANGE UP
BILIRUB UR-MCNC: NEGATIVE — SIGNIFICANT CHANGE UP
BUN SERPL-MCNC: 26 MG/DL — HIGH (ref 10–20)
BUN SERPL-MCNC: 26 MG/DL — HIGH (ref 10–20)
CALCIUM SERPL-MCNC: 9.5 MG/DL — SIGNIFICANT CHANGE UP (ref 8.4–10.5)
CALCIUM SERPL-MCNC: 9.5 MG/DL — SIGNIFICANT CHANGE UP (ref 8.4–10.5)
CAST: 0 /LPF — SIGNIFICANT CHANGE UP (ref 0–4)
CAST: 0 /LPF — SIGNIFICANT CHANGE UP (ref 0–4)
CHLORIDE SERPL-SCNC: 100 MMOL/L — SIGNIFICANT CHANGE UP (ref 98–110)
CHLORIDE SERPL-SCNC: 100 MMOL/L — SIGNIFICANT CHANGE UP (ref 98–110)
CO2 SERPL-SCNC: 25 MMOL/L — SIGNIFICANT CHANGE UP (ref 17–32)
CO2 SERPL-SCNC: 25 MMOL/L — SIGNIFICANT CHANGE UP (ref 17–32)
COLOR SPEC: YELLOW — SIGNIFICANT CHANGE UP
COLOR SPEC: YELLOW — SIGNIFICANT CHANGE UP
CREAT SERPL-MCNC: 1.4 MG/DL — SIGNIFICANT CHANGE UP (ref 0.7–1.5)
CREAT SERPL-MCNC: 1.4 MG/DL — SIGNIFICANT CHANGE UP (ref 0.7–1.5)
DIFF PNL FLD: NEGATIVE — SIGNIFICANT CHANGE UP
DIFF PNL FLD: NEGATIVE — SIGNIFICANT CHANGE UP
EGFR: 49 ML/MIN/1.73M2 — LOW
EGFR: 49 ML/MIN/1.73M2 — LOW
EOSINOPHIL # BLD AUTO: 0.15 K/UL — SIGNIFICANT CHANGE UP (ref 0–0.7)
EOSINOPHIL # BLD AUTO: 0.15 K/UL — SIGNIFICANT CHANGE UP (ref 0–0.7)
EOSINOPHIL NFR BLD AUTO: 2.6 % — SIGNIFICANT CHANGE UP (ref 0–8)
EOSINOPHIL NFR BLD AUTO: 2.6 % — SIGNIFICANT CHANGE UP (ref 0–8)
GLUCOSE SERPL-MCNC: 87 MG/DL — SIGNIFICANT CHANGE UP (ref 70–99)
GLUCOSE SERPL-MCNC: 87 MG/DL — SIGNIFICANT CHANGE UP (ref 70–99)
GLUCOSE UR QL: NEGATIVE MG/DL — SIGNIFICANT CHANGE UP
GLUCOSE UR QL: NEGATIVE MG/DL — SIGNIFICANT CHANGE UP
HCT VFR BLD CALC: 45.5 % — SIGNIFICANT CHANGE UP (ref 42–52)
HCT VFR BLD CALC: 45.5 % — SIGNIFICANT CHANGE UP (ref 42–52)
HGB BLD-MCNC: 15.1 G/DL — SIGNIFICANT CHANGE UP (ref 14–18)
HGB BLD-MCNC: 15.1 G/DL — SIGNIFICANT CHANGE UP (ref 14–18)
IMM GRANULOCYTES NFR BLD AUTO: 0.4 % — HIGH (ref 0.1–0.3)
IMM GRANULOCYTES NFR BLD AUTO: 0.4 % — HIGH (ref 0.1–0.3)
INR BLD: 1.71 RATIO — HIGH (ref 0.65–1.3)
INR BLD: 1.71 RATIO — HIGH (ref 0.65–1.3)
KETONES UR-MCNC: NEGATIVE MG/DL — SIGNIFICANT CHANGE UP
KETONES UR-MCNC: NEGATIVE MG/DL — SIGNIFICANT CHANGE UP
LEUKOCYTE ESTERASE UR-ACNC: ABNORMAL
LEUKOCYTE ESTERASE UR-ACNC: ABNORMAL
LYMPHOCYTES # BLD AUTO: 1.54 K/UL — SIGNIFICANT CHANGE UP (ref 1.2–3.4)
LYMPHOCYTES # BLD AUTO: 1.54 K/UL — SIGNIFICANT CHANGE UP (ref 1.2–3.4)
LYMPHOCYTES # BLD AUTO: 27 % — SIGNIFICANT CHANGE UP (ref 20.5–51.1)
LYMPHOCYTES # BLD AUTO: 27 % — SIGNIFICANT CHANGE UP (ref 20.5–51.1)
MCHC RBC-ENTMCNC: 31.5 PG — HIGH (ref 27–31)
MCHC RBC-ENTMCNC: 31.5 PG — HIGH (ref 27–31)
MCHC RBC-ENTMCNC: 33.2 G/DL — SIGNIFICANT CHANGE UP (ref 32–37)
MCHC RBC-ENTMCNC: 33.2 G/DL — SIGNIFICANT CHANGE UP (ref 32–37)
MCV RBC AUTO: 94.8 FL — HIGH (ref 80–94)
MCV RBC AUTO: 94.8 FL — HIGH (ref 80–94)
MONOCYTES # BLD AUTO: 0.53 K/UL — SIGNIFICANT CHANGE UP (ref 0.1–0.6)
MONOCYTES # BLD AUTO: 0.53 K/UL — SIGNIFICANT CHANGE UP (ref 0.1–0.6)
MONOCYTES NFR BLD AUTO: 9.3 % — SIGNIFICANT CHANGE UP (ref 1.7–9.3)
MONOCYTES NFR BLD AUTO: 9.3 % — SIGNIFICANT CHANGE UP (ref 1.7–9.3)
NEUTROPHILS # BLD AUTO: 3.4 K/UL — SIGNIFICANT CHANGE UP (ref 1.4–6.5)
NEUTROPHILS # BLD AUTO: 3.4 K/UL — SIGNIFICANT CHANGE UP (ref 1.4–6.5)
NEUTROPHILS NFR BLD AUTO: 59.5 % — SIGNIFICANT CHANGE UP (ref 42.2–75.2)
NEUTROPHILS NFR BLD AUTO: 59.5 % — SIGNIFICANT CHANGE UP (ref 42.2–75.2)
NITRITE UR-MCNC: NEGATIVE — SIGNIFICANT CHANGE UP
NITRITE UR-MCNC: NEGATIVE — SIGNIFICANT CHANGE UP
NRBC # BLD: 0 /100 WBCS — SIGNIFICANT CHANGE UP (ref 0–0)
NRBC # BLD: 0 /100 WBCS — SIGNIFICANT CHANGE UP (ref 0–0)
PH UR: 6 — SIGNIFICANT CHANGE UP (ref 5–8)
PH UR: 6 — SIGNIFICANT CHANGE UP (ref 5–8)
PLATELET # BLD AUTO: 191 K/UL — SIGNIFICANT CHANGE UP (ref 130–400)
PLATELET # BLD AUTO: 191 K/UL — SIGNIFICANT CHANGE UP (ref 130–400)
PMV BLD: 10.1 FL — SIGNIFICANT CHANGE UP (ref 7.4–10.4)
PMV BLD: 10.1 FL — SIGNIFICANT CHANGE UP (ref 7.4–10.4)
POTASSIUM SERPL-MCNC: 4.4 MMOL/L — SIGNIFICANT CHANGE UP (ref 3.5–5)
POTASSIUM SERPL-MCNC: 4.4 MMOL/L — SIGNIFICANT CHANGE UP (ref 3.5–5)
POTASSIUM SERPL-SCNC: 4.4 MMOL/L — SIGNIFICANT CHANGE UP (ref 3.5–5)
POTASSIUM SERPL-SCNC: 4.4 MMOL/L — SIGNIFICANT CHANGE UP (ref 3.5–5)
PROT SERPL-MCNC: 7.1 G/DL — SIGNIFICANT CHANGE UP (ref 6–8)
PROT SERPL-MCNC: 7.1 G/DL — SIGNIFICANT CHANGE UP (ref 6–8)
PROT UR-MCNC: NEGATIVE MG/DL — SIGNIFICANT CHANGE UP
PROT UR-MCNC: NEGATIVE MG/DL — SIGNIFICANT CHANGE UP
PROTHROM AB SERPL-ACNC: 19.8 SEC — HIGH (ref 9.95–12.87)
PROTHROM AB SERPL-ACNC: 19.8 SEC — HIGH (ref 9.95–12.87)
RBC # BLD: 4.8 M/UL — SIGNIFICANT CHANGE UP (ref 4.7–6.1)
RBC # BLD: 4.8 M/UL — SIGNIFICANT CHANGE UP (ref 4.7–6.1)
RBC # FLD: 13.6 % — SIGNIFICANT CHANGE UP (ref 11.5–14.5)
RBC # FLD: 13.6 % — SIGNIFICANT CHANGE UP (ref 11.5–14.5)
RBC CASTS # UR COMP ASSIST: 1 /HPF — SIGNIFICANT CHANGE UP (ref 0–4)
RBC CASTS # UR COMP ASSIST: 1 /HPF — SIGNIFICANT CHANGE UP (ref 0–4)
SODIUM SERPL-SCNC: 138 MMOL/L — SIGNIFICANT CHANGE UP (ref 135–146)
SODIUM SERPL-SCNC: 138 MMOL/L — SIGNIFICANT CHANGE UP (ref 135–146)
SP GR SPEC: 1.01 — SIGNIFICANT CHANGE UP (ref 1–1.03)
SP GR SPEC: 1.01 — SIGNIFICANT CHANGE UP (ref 1–1.03)
SQUAMOUS # UR AUTO: 3 /HPF — SIGNIFICANT CHANGE UP (ref 0–5)
SQUAMOUS # UR AUTO: 3 /HPF — SIGNIFICANT CHANGE UP (ref 0–5)
UROBILINOGEN FLD QL: 0.2 MG/DL — SIGNIFICANT CHANGE UP (ref 0.2–1)
UROBILINOGEN FLD QL: 0.2 MG/DL — SIGNIFICANT CHANGE UP (ref 0.2–1)
WBC # BLD: 5.71 K/UL — SIGNIFICANT CHANGE UP (ref 4.8–10.8)
WBC # BLD: 5.71 K/UL — SIGNIFICANT CHANGE UP (ref 4.8–10.8)
WBC # FLD AUTO: 5.71 K/UL — SIGNIFICANT CHANGE UP (ref 4.8–10.8)
WBC # FLD AUTO: 5.71 K/UL — SIGNIFICANT CHANGE UP (ref 4.8–10.8)
WBC UR QL: 2 /HPF — SIGNIFICANT CHANGE UP (ref 0–5)
WBC UR QL: 2 /HPF — SIGNIFICANT CHANGE UP (ref 0–5)

## 2023-10-26 PROCEDURE — 85610 PROTHROMBIN TIME: CPT

## 2023-10-26 PROCEDURE — 85730 THROMBOPLASTIN TIME PARTIAL: CPT

## 2023-10-26 PROCEDURE — 71046 X-RAY EXAM CHEST 2 VIEWS: CPT

## 2023-10-26 PROCEDURE — 93005 ELECTROCARDIOGRAM TRACING: CPT

## 2023-10-26 PROCEDURE — 80053 COMPREHEN METABOLIC PANEL: CPT

## 2023-10-26 PROCEDURE — 71046 X-RAY EXAM CHEST 2 VIEWS: CPT | Mod: 26

## 2023-10-26 PROCEDURE — 81001 URINALYSIS AUTO W/SCOPE: CPT

## 2023-10-26 PROCEDURE — 93010 ELECTROCARDIOGRAM REPORT: CPT

## 2023-10-26 PROCEDURE — 36415 COLL VENOUS BLD VENIPUNCTURE: CPT

## 2023-10-26 PROCEDURE — 85025 COMPLETE CBC W/AUTO DIFF WBC: CPT

## 2023-10-26 PROCEDURE — 99214 OFFICE O/P EST MOD 30 MIN: CPT | Mod: 25

## 2023-10-26 RX ORDER — ALFUZOSIN HYDROCHLORIDE 10 MG/1
1 TABLET, EXTENDED RELEASE ORAL
Qty: 0 | Refills: 0 | DISCHARGE

## 2023-10-26 RX ORDER — TAMSULOSIN HYDROCHLORIDE 0.4 MG/1
0 CAPSULE ORAL
Qty: 0 | Refills: 0 | DISCHARGE

## 2023-10-26 RX ORDER — AMLODIPINE BESYLATE 2.5 MG/1
1 TABLET ORAL
Qty: 0 | Refills: 0 | DISCHARGE

## 2023-10-26 NOTE — H&P PST ADULT - HISTORY OF PRESENT ILLNESS
Patient is a 87  year old male presenting to PAST in preparation for  RIGHT INGUINAL HERNIA REPAIR WITH MESH on 11/10 under lsb anesthesia by Dr. Rodriguez .  PATIENT CURRENTLY DENIES CHEST PAIN  SHORTNESS OF BREATH  PALPITATIONS,  DYSURIA, OR UPPER RESPIRATORY INFECTION IN PAST 2 WEEKS    Anesthesia Alert  NO--Difficult Airway  NO--History of neck surgery or radiation  NO--Limited ROM of neck  NO--History of Malignant hyperthermia  NO--Personal or family history of Pseudocholinesterase deficiency  NO--Prior Anesthesia Complication  NO--Latex Allergy  NO--Loose teeth  NO--History of Rheumatoid Arthritis  NO--NOLA  NO-- BLEEDING RISK  NO--Other_____    As per patient, this is their complete medical and surgical history, including medications both prescribed or over the counter.  Patient verbalized understanding of instructions and was given the opportunity to ask questions and have them answered.   Patient is a 87  year old male presenting to PAST in preparation for  RIGHT INGUINAL HERNIA REPAIR WITH MESH on 11/10 under lsb anesthesia by Dr. Rodriguez .  Reports h/o right inguinal hernia x 2 months Denies any pain presently was advised to have above  PATIENT CURRENTLY DENIES CHEST PAIN  SHORTNESS OF BREATH  PALPITATIONS,  DYSURIA, OR UPPER RESPIRATORY INFECTION IN PAST 2 WEEKS    Anesthesia Alert  NO--Difficult Airway  NO--History of neck surgery or radiation  NO--Limited ROM of neck  NO--History of Malignant hyperthermia  NO--Personal or family history of Pseudocholinesterase deficiency  NO--Prior Anesthesia Complication  NO--Latex Allergy  NO--Loose teeth  NO--History of Rheumatoid Arthritis  NO--NOLA  yes-- BLEEDING RISK( takes xarelto daily)  NO--Other_____      Duke Activity Status Index (DASI) from [employer]  on 10/26/2023      RESULT SUMMARY:  40.2 points  The higher the score (maximum 58.2), the higher the functional status.    7.68 METs        INPUTS:  Take care of self —> 2.75 = Yes  Walk indoors —> 1.75 = Yes  Walk 1&ndash;2 blocks on level ground —> 2.75 = Yes  Climb a flight of stairs or walk up a hill —> 5.5 = Yes  Run a short distance —> 8 = Yes  Do light work around the house —> 2.7 = Yes  Do moderate work around the house —> 3.5 = Yes  Do heavy work around the house —> 8 = Yes  Do yardwork —> 0 = No  Have sexual relations —> 5.25 = Yes  Participate in moderate recreational activities —> 0 = No  Participate in strenuous sports —> 0 = No    Revised Cardiac Risk Index for Pre-Operative Risk from [employer]  on 10/26/2023      RESULT SUMMARY:  2 points  Class III Risk    10.1 %  30-day risk of death, MI, or cardiac arrest    From [employer] 2017, based on pooled data from 5 high quality external validations (4 prospective). These numbers are higher than those often quoted from the now-outdated original study (Charlie 1999). See Evidence for details.      INPUTS:  Elevated-risk surgery —> 0 = No  History of ischemic heart disease —> 1 = Yes  History of congestive heart failure —> 0 = No  History of cerebrovascular disease —> 1 = Yes  Pre-operative treatment with insulin —> 0 = No  Pre-operative creatinine >2 mg/dL / 176.8 µmol/L —> 0 = No      As per patient, this is their complete medical and surgical history, including medications both prescribed or over the counter.  Patient verbalized understanding of instructions and was given the opportunity to ask questions and have them answered.

## 2023-10-26 NOTE — H&P PST ADULT - NSICDXFAMILYHX_GEN_ALL_CORE_FT
FAMILY HISTORY:  Father  Still living? No  FH: liver disease, Age at diagnosis: Age Unknown    Mother  Still living? Unknown  FH: aneurysm, Age at diagnosis: Age Unknown

## 2023-10-26 NOTE — H&P PST ADULT - ADMIT DATE
26-Oct-2023 Regular rate and rhythm, Heart sounds S1 S2 present, no murmurs, rubs or gallops NORMAL CARDIOPULMONARY EXAM. WELL-PERFUSED. NO HEPATOSPLENOMEGALY - Regular rate and rhythm, Heart sounds S1 S2 present, no murmurs, rubs or gallops

## 2023-10-26 NOTE — H&P PST ADULT - NSICDXPASTSURGICALHX_GEN_ALL_CORE_FT
PAST SURGICAL HISTORY:  No significant past surgical history      PAST SURGICAL HISTORY:  Pacemaker     S/P CABG (coronary artery bypass graft)

## 2023-10-26 NOTE — H&P PST ADULT - NSICDXPASTMEDICALHX_GEN_ALL_CORE_FT
PAST MEDICAL HISTORY:  Afib     HTN (hypertension)     Stroke      PAST MEDICAL HISTORY:  Afib     H/O inguinal hernia     HTN (hypertension)     Stroke

## 2023-10-27 DIAGNOSIS — K40.90 UNILATERAL INGUINAL HERNIA, WITHOUT OBSTRUCTION OR GANGRENE, NOT SPECIFIED AS RECURRENT: ICD-10-CM

## 2023-10-27 DIAGNOSIS — Z01.818 ENCOUNTER FOR OTHER PREPROCEDURAL EXAMINATION: ICD-10-CM

## 2023-11-10 ENCOUNTER — TRANSCRIPTION ENCOUNTER (OUTPATIENT)
Age: 88
End: 2023-11-10

## 2023-11-10 ENCOUNTER — APPOINTMENT (OUTPATIENT)
Dept: SURGERY | Facility: AMBULATORY SURGERY CENTER | Age: 88
End: 2023-11-10
Payer: MEDICARE

## 2023-11-10 ENCOUNTER — OUTPATIENT (OUTPATIENT)
Dept: OUTPATIENT SERVICES | Facility: HOSPITAL | Age: 88
LOS: 1 days | Discharge: HOME | End: 2023-11-10
Payer: MEDICARE

## 2023-11-10 VITALS
DIASTOLIC BLOOD PRESSURE: 55 MMHG | WEIGHT: 189.6 LBS | RESPIRATION RATE: 18 BRPM | HEIGHT: 70 IN | OXYGEN SATURATION: 99 % | TEMPERATURE: 97 F | HEART RATE: 78 BPM | SYSTOLIC BLOOD PRESSURE: 99 MMHG

## 2023-11-10 VITALS — DIASTOLIC BLOOD PRESSURE: 58 MMHG | SYSTOLIC BLOOD PRESSURE: 112 MMHG

## 2023-11-10 DIAGNOSIS — Z95.0 PRESENCE OF CARDIAC PACEMAKER: Chronic | ICD-10-CM

## 2023-11-10 DIAGNOSIS — K40.90 UNILATERAL INGUINAL HERNIA, WITHOUT OBSTRUCTION OR GANGRENE, NOT SPECIFIED AS RECURRENT: ICD-10-CM

## 2023-11-10 DIAGNOSIS — Z95.1 PRESENCE OF AORTOCORONARY BYPASS GRAFT: Chronic | ICD-10-CM

## 2023-11-10 PROCEDURE — C1781: CPT

## 2023-11-10 PROCEDURE — 49505 PRP I/HERN INIT REDUC >5 YR: CPT | Mod: RT

## 2023-11-10 RX ORDER — RIVAROXABAN 15 MG-20MG
1 KIT ORAL
Qty: 0 | Refills: 0 | DISCHARGE

## 2023-11-10 RX ORDER — LEVOTHYROXINE SODIUM 125 MCG
1 TABLET ORAL
Refills: 0 | DISCHARGE

## 2023-11-10 RX ORDER — METHENAMINE MANDELATE 1 G
1 TABLET ORAL
Refills: 0 | DISCHARGE

## 2023-11-10 RX ORDER — ALLOPURINOL 300 MG
1 TABLET ORAL
Qty: 0 | Refills: 0 | DISCHARGE

## 2023-11-10 RX ORDER — SODIUM CHLORIDE 9 MG/ML
1000 INJECTION, SOLUTION INTRAVENOUS
Refills: 0 | Status: DISCONTINUED | OUTPATIENT
Start: 2023-11-10 | End: 2023-11-10

## 2023-11-10 RX ORDER — ONDANSETRON 8 MG/1
4 TABLET, FILM COATED ORAL ONCE
Refills: 0 | Status: DISCONTINUED | OUTPATIENT
Start: 2023-11-10 | End: 2023-11-10

## 2023-11-10 RX ORDER — SILODOSIN 4 MG/1
1 CAPSULE ORAL
Qty: 0 | Refills: 0 | DISCHARGE

## 2023-11-10 RX ORDER — HYDROMORPHONE HYDROCHLORIDE 2 MG/ML
0.5 INJECTION INTRAMUSCULAR; INTRAVENOUS; SUBCUTANEOUS
Refills: 0 | Status: DISCONTINUED | OUTPATIENT
Start: 2023-11-10 | End: 2023-11-10

## 2023-11-10 RX ORDER — DRONEDARONE 400 MG/1
1 TABLET, FILM COATED ORAL
Refills: 0 | DISCHARGE

## 2023-11-10 RX ORDER — ROSUVASTATIN CALCIUM 5 MG/1
1 TABLET ORAL
Qty: 0 | Refills: 0 | DISCHARGE

## 2023-11-10 RX ORDER — METOPROLOL TARTRATE 50 MG
1 TABLET ORAL
Refills: 0 | DISCHARGE

## 2023-11-10 RX ORDER — FUROSEMIDE 40 MG
1 TABLET ORAL
Refills: 0 | DISCHARGE

## 2023-11-10 RX ORDER — LISINOPRIL 2.5 MG/1
1 TABLET ORAL
Qty: 0 | Refills: 0 | DISCHARGE

## 2023-11-10 RX ORDER — ACETAMINOPHEN 500 MG
650 TABLET ORAL ONCE
Refills: 0 | Status: DISCONTINUED | OUTPATIENT
Start: 2023-11-10 | End: 2023-11-10

## 2023-11-10 RX ORDER — FLUTICASONE PROPIONATE 220 MCG
1 AEROSOL WITH ADAPTER (GRAM) INHALATION
Refills: 0 | DISCHARGE

## 2023-11-10 RX ORDER — FINASTERIDE 5 MG/1
1 TABLET, FILM COATED ORAL
Refills: 0 | DISCHARGE

## 2023-11-10 RX ORDER — OXYCODONE HYDROCHLORIDE 5 MG/1
5 TABLET ORAL ONCE
Refills: 0 | Status: DISCONTINUED | OUTPATIENT
Start: 2023-11-10 | End: 2023-11-10

## 2023-11-10 RX ADMIN — SODIUM CHLORIDE 100 MILLILITER(S): 9 INJECTION, SOLUTION INTRAVENOUS at 12:00

## 2023-11-10 NOTE — ASU DISCHARGE PLAN (ADULT/PEDIATRIC) - MODE OF TRANSPORTATION
Avoid the sun for the next 72 hours, minimize strenuous activities.  Drink plenty of fluids to maintain hydration.  Return with any new or worsening symptoms, or any concerns.      
Wheelchair/Stroller

## 2023-11-10 NOTE — ASU DISCHARGE PLAN (ADULT/PEDIATRIC) - ASU DC SPECIAL INSTRUCTIONSFT
Diet    Eat light on the day of surgery. Nausea and vomiting can occur after anesthesia,   but usually resolve within 24 hours.  Resume normal diet the following day.      Activity    Rest!  No heavy lifting or strenuous activity.    Medications    Ibuprofen (Advil, Motrin), Naprosyn (Aleve) and/or Extra-Strength Tylenol for pain.  Tramadol for severe pain only.  Your prescription was sent electronically to your pharmacy.  Remember, Tramadol is a strong narcotic-like pain reliever which can cause drowsiness, upset stomach and constipation.  It should always be taken with food.  You can use stool softener (Mineral Oil) or laxative (MiraLax or Dulcolax) if constipated.  An antibiotic is given during surgery.  No antibiotic needed at home.  Resume all previous medications.  Resume blood thinners the day after surgery unless told otherwise.    Wound Care    Leave surgical dressing in place.  May shower (dressing is waterproof.)  No pool, ocean, lake, hot-tub or   bath for 3 weeks. If you were given an abdominal binder, please wear it as much as possible (day & night) for 2 weeks, but you must remove it for showers.  Ice packs to the area intermittently for several days help with pain and swelling.  Bruising (“black and blue”) is common.  Treatment is…Ice & Rest.       - You will see [facility] Physician Assistant, Ashley Avery, at your postoperative visit noted below.

## 2023-11-10 NOTE — ASU DISCHARGE PLAN (ADULT/PEDIATRIC) - NS MD DC FALL RISK RISK
For information on Fall & Injury Prevention, visit: [email or web address] OR  [email or web address] OR  https://www.cdc.gov/steadi/patient.html

## 2023-11-10 NOTE — ASU PREOP CHECKLIST - INTERNAL PROSTHESES
b/l knee replacemetn , Left Chest PPM/yes(specify) b/l knee replacement , Left Chest PPM/yes(specify)

## 2023-11-10 NOTE — ASU DISCHARGE PLAN (ADULT/PEDIATRIC) - CARE PROVIDER_API CALL
Ashvin Rodriguez  Surgery  [address] [place] [id number]  Phone: [phone]  Fax: [phone]  Scheduled Appointment: 11/20/2023 09:00 AM

## 2023-11-10 NOTE — ASU DISCHARGE PLAN (ADULT/PEDIATRIC) - COMMENTS
- You will see [facility] Physician Assistant, Ashley Avery, at your postoperative visit noted above.

## 2023-11-16 DIAGNOSIS — K40.90 UNILATERAL INGUINAL HERNIA, WITHOUT OBSTRUCTION OR GANGRENE, NOT SPECIFIED AS RECURRENT: ICD-10-CM

## 2023-11-16 DIAGNOSIS — Z95.0 PRESENCE OF CARDIAC PACEMAKER: ICD-10-CM

## 2023-11-16 DIAGNOSIS — Z95.1 PRESENCE OF AORTOCORONARY BYPASS GRAFT: ICD-10-CM

## 2023-11-16 DIAGNOSIS — Z86.73 PERSONAL HISTORY OF TRANSIENT ISCHEMIC ATTACK (TIA), AND CEREBRAL INFARCTION WITHOUT RESIDUAL DEFICITS: ICD-10-CM

## 2023-11-16 DIAGNOSIS — Z79.01 LONG TERM (CURRENT) USE OF ANTICOAGULANTS: ICD-10-CM

## 2023-11-16 DIAGNOSIS — I48.91 UNSPECIFIED ATRIAL FIBRILLATION: ICD-10-CM

## 2023-11-16 DIAGNOSIS — Z87.891 PERSONAL HISTORY OF NICOTINE DEPENDENCE: ICD-10-CM

## 2023-11-16 DIAGNOSIS — I10 ESSENTIAL (PRIMARY) HYPERTENSION: ICD-10-CM

## 2023-11-20 ENCOUNTER — APPOINTMENT (OUTPATIENT)
Dept: SURGERY | Facility: CLINIC | Age: 88
End: 2023-11-20
Payer: MEDICARE

## 2023-11-20 PROCEDURE — 99024 POSTOP FOLLOW-UP VISIT: CPT

## 2023-11-20 RX ORDER — TRAMADOL HYDROCHLORIDE 50 MG/1
50 TABLET, COATED ORAL
Qty: 20 | Refills: 0 | Status: COMPLETED | COMMUNITY
Start: 2023-11-10 | End: 2023-11-20

## 2024-01-18 ENCOUNTER — APPOINTMENT (OUTPATIENT)
Dept: SURGERY | Facility: CLINIC | Age: 89
End: 2024-01-18
Payer: MEDICARE

## 2024-01-18 VITALS — WEIGHT: 190 LBS | HEIGHT: 70 IN | BODY MASS INDEX: 27.2 KG/M2

## 2024-01-18 DIAGNOSIS — K40.90 UNILATERAL INGUINAL HERNIA, W/OUT OBSTRUCTION OR GANGRENE, NOT SPECIFIED AS RECURRENT: ICD-10-CM

## 2024-01-18 PROBLEM — Z87.19 PERSONAL HISTORY OF OTHER DISEASES OF THE DIGESTIVE SYSTEM: Chronic | Status: ACTIVE | Noted: 2023-10-26

## 2024-01-18 PROCEDURE — 99024 POSTOP FOLLOW-UP VISIT: CPT

## 2024-01-18 NOTE — ASSESSMENT
[FreeTextEntry1] : Gilberto presents back to the office today approximately 8 weeks after the repairs of his large direct right inguinal hernia with mesh concerned about occasional pain and pulling sensation in the area of his prior surgery.  Physical examination demonstrates a very well-healed scar with no evidence of hernia recurrence or delayed wound complications. There is no evidence of muscular hematoma or any significant abdominal pathology. His abdomen is soft and nontender with no signs of peritonitis. His weight has remained stable and his current BMI is 27.  Gilberto and his wife were counseled and reassured. I believe his symptoms are consistent with postoperative healing and will resolve with time. The patient was encouraged to return to me in the future if these symptoms persist or worsen, of course.

## 2024-01-18 NOTE — CONSULT LETTER
[Dear  ___] : Dear  [unfilled], [Courtesy Letter:] : I had the pleasure of seeing your patient, [unfilled], in my office today. [Please see my note below.] : Please see my note below. [Consult Closing:] : Thank you very much for allowing me to participate in the care of this patient.  If you have any questions, please do not hesitate to contact me. [Sincerely,] : Sincerely, [FreeTextEntry3] :    Lori Avery PA-C, ETHANAS [DrIrasema  ___] : Dr. ARSHAD No

## 2024-01-31 NOTE — ED PROVIDER NOTE - CONSTITUTIONAL NEGATIVE STATEMENT, MLM
The following labs were labeled with appropriate pt sticker and tubed to lab:     [x] Blue     [x] Lavender   [] on ice  [x] Green/yellow  [x] Green/black [] on ice  [] Grey  [] on ice  [] Yellow  [] Red  [] Pink  [] Type/ Screen  [] ABG  [] VBG    [] COVID-19 swab    [] Rapid  [] PCR  [] Flu swab  [] Peds Viral Panel     [] Urine Sample  [] Fecal Sample  [] Pelvic Cultures  [] Blood Cultures  [] X 2  [] STREP Cultures  [] Wound Cultures     no fever and no chills.

## 2024-12-18 NOTE — PRE-ANESTHESIA EVALUATION ADULT - NSANTHAGERD_ENT_A_CORE
Discharge Planner Post-Acute Rehab OT:      Discharge Plan: Home with assistance. HC OT services     Precautions: Falls, continuous tube feeding and NPO, BUE weakness with R greater than L  *elevate BUE/BLE's when supine in bed with pillows*  R resting hand orthosis - on overnight/off during day     Current Status:  ADLs:  Mobility: A x 1 pivot with walker. A x 2 nursing. Wheelchair based.  Grooming: Min A due to impaired UE function.  Dressing: UB Max A. LB Max A FWW.  Bathing: TBA  Toileting: A x 1 pivot to commode overlay. [facility] nursing. A x 1 clothing management/hygiene.   IADLs: IND prior, supportive spouse  Vision/Cognition: Ox3. Wears corrective lenses     Assessment: pt able to brush teeth and feed self one ice chip at a time with l hand with right angle toothbrush disla and utincil disla   Plan to fabricate L resting hand orthosis due to flexor tightness and to promote a functional hand position.     Other Barriers to Discharge (DME, Family Training, etc):   Family training prior to discharge  AE/DME: pending progress    Yes

## 2025-02-25 NOTE — ED ADULT NURSE NOTE - CHIEF COMPLAINT QUOTE
You can Purchase these products below on [employer] and [facility] pharmacy   Recommended OTC Sarana as needed.  2.   Can use capsaicin 5x/day if needed.  This is over the counter. This will cause a temporary burning sensation.    --------------------------------------------------------------------------------------------------------------------------------------------------------------------------------------------    LIQUID NITROGEN INSTRUCTIONS    Freezing with liquid nitrogen is accompanied by a stinging, burning sensation which usually lasts from 15 minutes up to several hours.     It is normal for a blister to form at the treatment site.  Occasionally this will be a blood blister.  It is usually best to leave the blister unopened.  The blister normally breaks in the first few days, but on the fingers it can last longer.  If it is painful, it can be opened after cleansing the area with soap and water followed by rubbing alcohol.  A needle that has been sterilized with a match and then wiped clean with rubbing alcohol can be used to open the blister.  Wash hands before opening the blister.    The blister may form into a dry crust.  The crust should peel off in 2-4 weeks.  There may be some minimal redness after the crust falls off, but this should fade with time.     It is okay to wash, shampoo, shower or apply cosmetics to the area, but the area should not be rubbed as this can irritate it.  Pat dry with a soft towel.    Apply Vaseline to the treated area four times a day and it will heal faster.  Wash hands before applying Vaseline.    Possible side effects: risk of permanent pigment change (lighter or darker skin), persistence, blister, crusting, discomfort, infection.      Please call if any questions:    Dr. Muchard  [facility]  [place]: [phone]  pt c/o coughing x 1 week, and chest pain started today.                 --------------------------------------------------------------------------------------------------------------------------------------------------------------------------------------------  Good moisturizers:  Cerave, Cetaphil, Aveeno, Vanicream, Eucerin  Plain vaseline, Vaniply    CREAM is more moisturizing than LOTION. The thicker the better.      Moisturize daily after showering when skin is still damp.  Many people need to moisturize 2x/day.  Avoid hot showers as this can dry out the skin.    Use Dove sensitive bar soap, Cetaphil or CeraVe cleanser or Vanicream soap.     Sun Protection    Sun exposure leads to premature aging, skin wrinkling, and increased risk of skin cancer. Protecting yourself from the sun can help prevent this damage.      Ways to protect yourself include:  Staying in the shade during peak UV radiation hours of 10:00 am to 3:00 pm  Wear sun protective clothing, including a long-sleeved shirt, pants, wide-brimmed hat, and sun glasses. There are many good companies to find these at, including [employer], [employer], etc.  Applying sunscreen  Avoiding tanning beds    Sunscreen tips:  Look for a sunscreen that is labeled as broad-spectrum and has an SPF of 30 or greater.  Broad-spectrum means that it protects against both UVB (sun rays causing sunburns and skin cancer) and UVA (rays causing aging, wrinkling, and higher risk of skin cancer).  Apply sunscreen generously.  A general rule of thumb is that it takes your handful of sunscreen to cover your body (child’s hand size for child’s body, larger hand size for larger body).  Applying less than this will mean that you won’t reach the listed SPF on the bottle.  Apply sunscreen 15 minutes before going outside and reapply at least every 2 hours or immediately after swimming    Frequently asked questions:    Are higher SPF sunscreens better?   An SPF of 30 protects against 97% of the sun’s rays when applied in the correct  amount and reapplied often enough.  Higher SPFs only cover slightly more of the sun’s ray, therefore, there is no need to use very high SPF numbers.  It is more important to apply enough sunscreen and reapply frequently.    What sunscreens are best for infants and children?  Sunscreens containing the ingredients zinc oxide and titanium dioxide are recommended for infants >6 months and children.  These sunscreens are physical blockers, which mean they sit on the skin’s surface and physically block UV rays. Because of this, these are also good sunscreens for people with sensitive skin or those who have problems with regular sunscreens burning and stinging with application.  Because of their very sensitive skin, we recommend that infants <6 months old stay in the shade and avoid sun exposure.    What types of sunscreen are best? Are spray sunscreens ok to use?  In general, the type of sunscreen that you will consistently use is the best!  We recommend cream sunscreens because these are the easiest to make sure you use enough.  Stick sunscreens are good for using on the face and around the eyes to prevent eye exposure.  Spray sunscreens can be used, but we recommend you spray close to your skin and follow this by rubbing over the sunscreen to make sure it is evenly applied.  Never use spray sunscreens on the face or in a poorly ventilated area because of the risk of inhalation.  It is difficult to tell how much sunscreen you’ve applied when using a spray sunscreen, so it is still preferred that you use a cream sunscreen if possible.     Are the ingredients in sunscreens safe?  Scientific evidence supports the benefits of using sunscreen to minimize short- and long-term damage to the skin from sun exposure. No published studies show that sunscreen is toxic to humans or hazardous to human health. Dermatologists agree that preventing skin cancer and sunburn far outweigh any unproven concerns for toxicity or human health  hazard from sunscreen ingredients.  Before ingredients in sunscreen can be used, the ingredient must be approved by the FDA.     Oxybenzone and retinyl palmitate have recently been in the news and their safety has been questioned.  Oxybenzone effectively protects our skin from UVA and UVB rays and was approved by the FDA in 1978.  No data shows that oxybenzone causes hormonal problems in humans or any significant healthy problems.  Retinyl palmitate is a form of vitamin A found naturally in the skin that helps protect our skin from premature aging.  No study shows that it increases the risk of skin cancer in humans, and in fact, one form of vitamin A (retinoids) has been used for decades to prevent skin cancer in people who have a high risk of developing skin cancers.      For more information regarding the specific studies showing the safety of these ingredients please visit the websites below.    http://www.skincancer.org/prevention/sun-protection/sunscreen/sunscreens-safe-and-effective    [email or web address]